# Patient Record
Sex: MALE | Race: WHITE | NOT HISPANIC OR LATINO | Employment: FULL TIME | RURAL
[De-identification: names, ages, dates, MRNs, and addresses within clinical notes are randomized per-mention and may not be internally consistent; named-entity substitution may affect disease eponyms.]

---

## 2021-05-10 VITALS — SYSTOLIC BLOOD PRESSURE: 164 MMHG | DIASTOLIC BLOOD PRESSURE: 98 MMHG | HEART RATE: 72 BPM | TEMPERATURE: 98 F

## 2021-05-10 RX ORDER — NEBIVOLOL 5 MG/1
5 TABLET ORAL DAILY
COMMUNITY
End: 2022-04-07 | Stop reason: DRUGHIGH

## 2022-04-07 ENCOUNTER — OFFICE VISIT (OUTPATIENT)
Dept: FAMILY MEDICINE | Facility: CLINIC | Age: 47
End: 2022-04-07
Payer: COMMERCIAL

## 2022-04-07 VITALS
HEART RATE: 67 BPM | HEIGHT: 69 IN | DIASTOLIC BLOOD PRESSURE: 87 MMHG | WEIGHT: 225 LBS | BODY MASS INDEX: 33.33 KG/M2 | SYSTOLIC BLOOD PRESSURE: 124 MMHG

## 2022-04-07 DIAGNOSIS — I10 PRIMARY HYPERTENSION: Primary | Chronic | ICD-10-CM

## 2022-04-07 DIAGNOSIS — E73.9 LACTOSE INTOLERANCE IN ADULT: ICD-10-CM

## 2022-04-07 PROCEDURE — 99203 PR OFFICE/OUTPT VISIT, NEW, LEVL III, 30-44 MIN: ICD-10-PCS | Mod: ,,, | Performed by: FAMILY MEDICINE

## 2022-04-07 PROCEDURE — 99203 OFFICE O/P NEW LOW 30 MIN: CPT | Mod: ,,, | Performed by: FAMILY MEDICINE

## 2022-04-07 RX ORDER — NEBIVOLOL 10 MG/1
10 TABLET ORAL DAILY
Qty: 90 TABLET | Refills: 2 | Status: SHIPPED | OUTPATIENT
Start: 2022-04-07 | End: 2022-05-06 | Stop reason: SDUPTHER

## 2022-04-07 RX ORDER — NEBIVOLOL 5 MG/1
5 TABLET ORAL DAILY
Qty: 30 TABLET | Refills: 2 | Status: CANCELLED | OUTPATIENT
Start: 2022-04-07

## 2022-04-07 NOTE — PROGRESS NOTES
Lorenzo Montes MD   [facility]  MEDICAL GROUP [address] [address]  [phone]      PATIENT NAME: Mp Cook  : 1975  DATE: 22  MRN: [id number]      Billing Provider: Lorenzo Montes MD  Level of Service:   Patient PCP Information       Provider PCP Type    Lorenzo Montes MD General            Reason for Visit / Chief Complaint: Follow-up (Medication refills )       Update PCP  Update Chief Complaint         History of Present Illness / Problem Focused Workflow     Mp Cook presents to the clinic with Follow-up (Medication refills )       47 yo WM treated for HTN.  Needs med refill.  Says that he monitors BP at home and is usually mildly elevated.    Has been having some lactose intolerance.  He denies any recent GI infection.      Review of Systems     Review of Systems   Constitutional: Negative for activity change and unexpected weight change.   HENT: Negative for hearing loss, rhinorrhea and trouble swallowing.    Eyes: Negative for discharge and visual disturbance.   Respiratory: Negative for chest tightness and wheezing.    Cardiovascular: Negative for chest pain and palpitations.   Gastrointestinal: Negative for blood in stool, constipation, diarrhea and vomiting.   Endocrine: Negative for polydipsia and polyuria.   Genitourinary: Negative for difficulty urinating, hematuria and urgency.   Musculoskeletal: Negative for arthralgias, joint swelling and neck pain.   Neurological: Negative for weakness and headaches.   Psychiatric/Behavioral: Negative for confusion and dysphoric mood.        Medical / Social / Family History   History reviewed. No pertinent past medical history.    History reviewed. No pertinent surgical history.    Social History    reports that he has never smoked. His smokeless tobacco use includes snuff. He reports current alcohol use of about 6.0 standard drinks of alcohol per week.    Social History     Tobacco Use    Smoking status: Never Smoker    Smokeless tobacco: Current User     Types: Snuff    Tobacco comment: Started Snuff in 2000.   Substance Use Topics    Alcohol use: Yes     Alcohol/week: 6.0 standard drinks     Types: 6 Cans of beer per week     Comment: drinks approximately a 6 pack every other weekend       Family History  Family History   Problem Relation Age of Onset    Hypertension Mother        Medications and Allergies     Medications  Outpatient Medications Marked as Taking for the 4/7/22 encounter (Office Visit) with Lorenzo Montes MD   Medication Sig Dispense Refill    [DISCONTINUED] nebivoloL (BYSTOLIC) 5 MG Tab Take 5 mg by mouth once daily.         Allergies  Review of patient's allergies indicates:   Allergen Reactions    Opioids - morphine analogues        Physical Examination     Vitals:    04/07/22 1439   BP: 124/87   Pulse: 67     Physical Exam  Vitals reviewed.   Constitutional:       Appearance: Normal appearance.   HENT:      Head: Normocephalic and atraumatic.   Eyes:      Extraocular Movements: Extraocular movements intact.      Conjunctiva/sclera: Conjunctivae normal.      Pupils: Pupils are equal, round, and reactive to light.   Cardiovascular:      Rate and Rhythm: Normal rate and regular rhythm.      Heart sounds: Normal heart sounds.   Pulmonary:      Effort: Pulmonary effort is normal.      Breath sounds: Normal breath sounds.   Musculoskeletal:         General: Normal range of motion.      Cervical back: Normal range of motion and neck supple.   Skin:     General: Skin is warm and dry.      Comments: Multiple UE tattoos   Neurological:      General: No focal deficit present.      Mental Status: He is alert and oriented to person, place, and time.   Psychiatric:         Mood and Affect: Mood normal.         Behavior: Behavior normal.          Assessment and Plan (including Health Maintenance)      Problem List  Smart Sets  Document Outside HM    :    Plan: will increase dose of bystolic to 10 mg.  He is asked to moniotr BP at home and maintain log.  Follow up in one month and bring BP log to visit.    Avoid dairy products x 1-2 weeks and see if GI symptoms resolve.  Gradually reintroduce daily to see if symptoms are present.  May try lactaid.          Health Maintenance Due   Topic Date Due    PROSTATE-SPECIFIC ANTIGEN  Never done    Hepatitis C Screening  Never done    Lipid Panel  Never done    HIV Screening  Never done    TETANUS VACCINE  Never done    Colorectal Cancer Screening  Never done    Influenza Vaccine (1) Never done    COVID-19 Vaccine (3 - Booster for Pfizer series) 04/19/2022       Problem List Items Addressed This Visit        Cardiac/Vascular    Primary hypertension - Primary (Chronic)    Relevant Medications    nebivoloL (BYSTOLIC) 10 MG Tab      Other Visit Diagnoses     Lactose intolerance in adult              The patient has no Health Maintenance topics of status Not Due    Future Appointments   Date Time Provider Department Center   5/6/2022  4:00 PM Lorenzo Montes MD [facility]            Signature:  MD BARBARA [facility]  MEDICAL GROUP [facility]  [address] [place] [address] [place] [address]  [phone]    Date of encounter: 4/7/22

## 2022-04-26 ENCOUNTER — TELEPHONE (OUTPATIENT)
Dept: FAMILY MEDICINE | Facility: CLINIC | Age: 47
End: 2022-04-26
Payer: COMMERCIAL

## 2022-05-06 ENCOUNTER — OFFICE VISIT (OUTPATIENT)
Dept: FAMILY MEDICINE | Facility: CLINIC | Age: 47
End: 2022-05-06
Payer: COMMERCIAL

## 2022-05-06 VITALS — HEART RATE: 63 BPM | DIASTOLIC BLOOD PRESSURE: 89 MMHG | SYSTOLIC BLOOD PRESSURE: 134 MMHG

## 2022-05-06 DIAGNOSIS — I10 PRIMARY HYPERTENSION: Primary | Chronic | ICD-10-CM

## 2022-05-06 DIAGNOSIS — M62.830 MUSCLE SPASM OF BACK: ICD-10-CM

## 2022-05-06 PROCEDURE — 99213 PR OFFICE/OUTPT VISIT, EST, LEVL III, 20-29 MIN: ICD-10-PCS | Mod: ,,, | Performed by: FAMILY MEDICINE

## 2022-05-06 PROCEDURE — 99213 OFFICE O/P EST LOW 20 MIN: CPT | Mod: ,,, | Performed by: FAMILY MEDICINE

## 2022-05-06 RX ORDER — TIZANIDINE 4 MG/1
4 TABLET ORAL EVERY 6 HOURS PRN
Qty: 20 TABLET | Refills: 0 | Status: SHIPPED | OUTPATIENT
Start: 2022-05-06 | End: 2022-05-17

## 2022-05-06 RX ORDER — NEBIVOLOL 10 MG/1
10 TABLET ORAL DAILY
Qty: 90 TABLET | Refills: 2 | Status: SHIPPED | OUTPATIENT
Start: 2022-05-06 | End: 2022-12-16 | Stop reason: SDUPTHER

## 2022-05-06 NOTE — PROGRESS NOTES
Lorenzo Montes MD   [facility]  MEDICAL GROUP [facility] - FAMILY [facility]  [address] [place] [address]  [phone]      PATIENT NAME: Mp Cook  : 1975  DATE: 22  MRN: [id number]      Billing Provider: Lorenzo Montes MD  Level of Service:   Patient PCP Information       Provider PCP Type    Lorenzo Montes MD General            Reason for Visit / Chief Complaint: Follow-up (1 Month Follow-up HTN. ) and Back Pain (Complains of mid lower back pain after lifting tailgate of trailer. Pain is positional. )       Update PCP  Update Chief Complaint         History of Present Illness / Problem Focused Workflow     Mp Cook presents to the clinic with Follow-up (1 Month Follow-up HTN. ) and Back Pain (Complains of mid lower back pain after lifting tailgate of trailer. Pain is positional. )       BP is doing well on increased dose of bystolic (10 mg daily- increased from 5 mg at last visit).    Back pain is mid-lower back.   Rates as 8/10.      Review of Systems     Review of Systems   Constitutional: Negative for chills, fatigue and fever.   HENT: Negative for sinus pressure/congestion, sore throat and trouble swallowing.    Eyes: Negative for pain, itching and visual disturbance.   Respiratory: Negative for cough, shortness of breath and wheezing.    Cardiovascular: Negative for chest pain, palpitations and leg swelling.   Gastrointestinal: Negative for abdominal pain, change in bowel habit, nausea, vomiting and change in bowel habit.   Musculoskeletal: Positive for back pain. Negative for arthralgias, joint swelling and myalgias.   Integumentary:  Negative for rash and mole/lesion.   Neurological: Negative for dizziness, syncope, weakness, light-headedness, numbness and headaches.   Psychiatric/Behavioral: Negative for confusion.        Medical / Social / Family History   History reviewed. No pertinent past medical history.    History reviewed. No  pertinent surgical history.    Social History    reports that he has never smoked. His smokeless tobacco use includes snuff. He reports current alcohol use of about 6.0 standard drinks of alcohol per week.   Social History     Tobacco Use    Smoking status: Never Smoker    Smokeless tobacco: Current User     Types: Snuff    Tobacco comment: Started Snuff in 2000.   Substance Use Topics    Alcohol use: Yes     Alcohol/week: 6.0 standard drinks     Types: 6 Cans of beer per week     Comment: drinks approximately a 6 pack every other weekend       Family History  Family History   Problem Relation Age of Onset    Hypertension Mother        Medications and Allergies     Medications  Outpatient Medications Marked as Taking for the 5/6/22 encounter (Office Visit) with Lorenzo Montes MD   Medication Sig Dispense Refill    [DISCONTINUED] nebivoloL (BYSTOLIC) 10 MG Tab Take 1 tablet (10 mg total) by mouth once daily. 90 tablet 2       Allergies  Review of patient's allergies indicates:   Allergen Reactions    Opioids - morphine analogues        Physical Examination     Vitals:    05/06/22 1608   BP: 134/89   Pulse: 63     Physical Exam     Assessment and Plan (including Health Maintenance)      Problem List  Smart Sets  Document Outside HM   :    Plan: continue bystolic 10 mg QD        Health Maintenance Due   Topic Date Due    PROSTATE-SPECIFIC ANTIGEN  Never done    Hepatitis C Screening  Never done    Lipid Panel  Never done    HIV Screening  Never done    TETANUS VACCINE  Never done    Colorectal Cancer Screening  Never done    COVID-19 Vaccine (3 - Booster for Pfizer series) 04/19/2022       Problem List Items Addressed This Visit        Cardiac/Vascular    Primary hypertension - Primary (Chronic)    Relevant Medications    nebivoloL (BYSTOLIC) 10 MG Tab      Other Visit Diagnoses     Muscle spasm of back        Relevant Medications    tiZANidine (ZANAFLEX) 4 MG tablet          Health Maintenance Topics  with due status: Not Due       Topic Last Completion Date    Influenza Vaccine Not Due       No future appointments.         Signature:  MD BARBARA Wilcox [facility]  MEDICAL GROUP [address] [address] [phone]    Date of encounter: 5/6/22

## 2022-05-17 RX ORDER — TIZANIDINE 4 MG/1
4 TABLET ORAL EVERY 6 HOURS PRN
Qty: 20 TABLET | Refills: 0
Start: 2022-05-17 | End: 2022-05-27

## 2022-08-30 ENCOUNTER — OFFICE VISIT (OUTPATIENT)
Dept: FAMILY MEDICINE | Facility: CLINIC | Age: 47
End: 2022-08-30
Payer: COMMERCIAL

## 2022-08-30 VITALS
OXYGEN SATURATION: 98 % | HEIGHT: 69 IN | SYSTOLIC BLOOD PRESSURE: 141 MMHG | HEART RATE: 71 BPM | BODY MASS INDEX: 33.33 KG/M2 | DIASTOLIC BLOOD PRESSURE: 96 MMHG | WEIGHT: 225 LBS
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
## 2022-08-30 DIAGNOSIS — M79.674 TOE PAIN, RIGHT: Primary | ICD-10-CM

## 2022-08-30 PROCEDURE — 99212 OFFICE O/P EST SF 10 MIN: CPT | Mod: ,,, | Performed by: NURSE PRACTITIONER

## 2022-08-30 PROCEDURE — 99212 PR OFFICE/OUTPT VISIT, EST, LEVL II, 10-19 MIN: ICD-10-PCS | Mod: ,,, | Performed by: NURSE PRACTITIONER

## 2022-08-30 NOTE — LETTER
August 30, 2022      [facility]  [address] [place] [address]  Phone: [phone]  Fax: [phone]       Patient: Mp Cook   YOB: 1975  Date of Visit: 08/30/2022    To Whom It May Concern:    YECENIA Cook  was at [facility] on 08/30/2022. The patient may return to work/school on 08/30/2022.  If you have any questions or concerns, or if I can be of further assistance, please do not hesitate to contact me.    Sincerely,    BECKA AburtoP

## 2022-08-30 NOTE — PROGRESS NOTES
Clinic note     Patient name: Mp Cook is a 46 y.o. male   Chief compliant   Chief Complaint   Patient presents with    Toe Injury     [place]y [place].        Subjective     History of present illness   In clinic for evaluation of right 5th toe pain since Sunday when he slipped while walking on rocks. States his foot went down between two large rocks. He denies any other injuries   His employer wanted him to have it evaluated before he reporting back to work today   He reports pain to right 5th toe with weight bearing    PCP: Dr Montes         Social History     Tobacco Use    Smoking status: Never    Smokeless tobacco: Current     Types: Snuff    Tobacco comments:     Started Snuff in 2000.   Substance Use Topics    Alcohol use: Yes     Alcohol/week: 6.0 standard drinks     Types: 6 Cans of beer per week     Comment: drinks approximately a 6 pack every other weekend    Drug use: Never       Review of patient's allergies indicates:   Allergen Reactions    Opioids - morphine analogues        Past Medical History:   Diagnosis Date    Hypertension        Past Surgical History:   Procedure Laterality Date    APPENDECTOMY      TONSILLECTOMY          Family History   Problem Relation Age of Onset    Hypertension Mother          Current Outpatient Medications:     nebivoloL (BYSTOLIC) 10 MG Tab, Take 1 tablet (10 mg total) by mouth once daily., Disp: 90 tablet, Rfl: 2    Review of Systems   Constitutional:  Negative for chills and fever.   Respiratory:  Negative for cough and shortness of breath.    Cardiovascular:  Negative for chest pain, palpitations and leg swelling.   Gastrointestinal:  Negative for abdominal pain.   Musculoskeletal:  Positive for arthralgias. Negative for gait problem and myalgias.        Right 5th toe pain   Integumentary:  Negative for wound.   Neurological:  Negative for dizziness and headaches.     Objective     BP (!) 141/96 (BP Location: Left arm, Patient  "Position: Sitting, BP Method: Large (Automatic))   Pulse 71   Ht 5' 9" (1.753 m)   Wt 102.1 kg (225 lb)   SpO2 98%   BMI 33.23 kg/m²     Physical Exam   Constitutional: He is oriented to person, place, and time. No distress.   HENT:   Head: Atraumatic.   Cardiovascular: Normal rate and regular rhythm. Pulmonary:      Effort: Pulmonary effort is normal. No respiratory distress.      Breath sounds: Normal breath sounds. No wheezing, rhonchi or rales.     Musculoskeletal:      Cervical back: Neck supple.        Feet:    Neurological: He is alert and oriented to person, place, and time. Gait abnormal.   Skin: Skin is warm and dry.   Psychiatric: His behavior is normal. Mood normal.   EXAMINATION:  XR TOE 2 OR MORE VIEWS RIGHT     CLINICAL HISTORY:  Unspecified fall, initial encounter     COMPARISON:  None available     FINDINGS:  No evidence of fracture seen.  The alignment of the joints appears normal.  No degenerative change is present.  No soft tissue abnormality is seen.     Impression:     No evidence of abnormality demonstrated        Electronically signed by: Sonu Esparza  Date:                                            08/30/2022  Time:                                           11:23           Exam Ended: 08/30/22 11:12           No results found for: WBC, HGB, HCT, MCV, PLT    CMP  No results found for: NA, K, CL, CO2, GLU, BUN, CREATININE, CALCIUM, PROT, ALBUMIN, BILITOT, ALKPHOS, AST, ALT, ANIONGAP, ESTGFRAFRICA, EGFRNONAA  No results found for: TSH  No results found for: CHOL  No results found for: HDL  No results found for: LDLCALC  No results found for: TRIG  No results found for: CHOLHDL  No results found for: LABA1C, HGBA1C      Assessment and Plan   Toe pain, right  -     X-Ray Toe 2 or More Views Right; Future; Expected date: 08/30/2022    Fall, initial encounter  -     X-Ray Toe 2 or More Views Right; Future; Expected date: 08/30/2022          Patient Instructions  Patient Instructions   Wear " good supportive shoe  Follow up in clinic if symptoms persist     Xray results reviewed, instructed if pain continues rtc in 5-7 days for repeat xray

## 2022-12-16 ENCOUNTER — OFFICE VISIT (OUTPATIENT)
Dept: FAMILY MEDICINE | Facility: CLINIC | Age: 47
End: 2022-12-16
Payer: COMMERCIAL

## 2022-12-16 VITALS
DIASTOLIC BLOOD PRESSURE: 87 MMHG | WEIGHT: 225 LBS | BODY MASS INDEX: 33.33 KG/M2 | HEART RATE: 67 BPM | HEIGHT: 69 IN | SYSTOLIC BLOOD PRESSURE: 137 MMHG

## 2022-12-16 DIAGNOSIS — I10 PRIMARY HYPERTENSION: Primary | Chronic | ICD-10-CM

## 2022-12-16 DIAGNOSIS — R05.8 PRODUCTIVE COUGH: ICD-10-CM

## 2022-12-16 DIAGNOSIS — J06.9 UPPER RESPIRATORY TRACT INFECTION, UNSPECIFIED TYPE: ICD-10-CM

## 2022-12-16 DIAGNOSIS — R06.2 WHEEZING: ICD-10-CM

## 2022-12-16 PROCEDURE — 99213 PR OFFICE/OUTPT VISIT, EST, LEVL III, 20-29 MIN: ICD-10-PCS | Mod: ,,, | Performed by: FAMILY MEDICINE

## 2022-12-16 PROCEDURE — 99213 OFFICE O/P EST LOW 20 MIN: CPT | Mod: ,,, | Performed by: FAMILY MEDICINE

## 2022-12-16 RX ORDER — NEBIVOLOL 10 MG/1
10 TABLET ORAL DAILY
Qty: 90 TABLET | Refills: 2 | Status: SHIPPED | OUTPATIENT
Start: 2022-12-16 | End: 2023-08-01 | Stop reason: SDUPTHER

## 2022-12-16 RX ORDER — AZITHROMYCIN 250 MG/1
TABLET, FILM COATED ORAL
Qty: 6 TABLET | Refills: 0 | Status: SHIPPED | OUTPATIENT
Start: 2022-12-16 | End: 2022-12-21

## 2022-12-16 RX ORDER — ALBUTEROL SULFATE 90 UG/1
2 AEROSOL, METERED RESPIRATORY (INHALATION) EVERY 6 HOURS PRN
Qty: 18 G | Refills: 0 | Status: SHIPPED | OUTPATIENT
Start: 2022-12-16 | End: 2023-03-09 | Stop reason: SDUPTHER

## 2022-12-16 RX ORDER — DEXAMETHASONE 0.75 MG/1
TABLET ORAL
Qty: 21 TABLET | Refills: 0 | Status: SHIPPED | OUTPATIENT
Start: 2022-12-16 | End: 2023-02-14 | Stop reason: ALTCHOICE

## 2022-12-16 RX ORDER — NEBIVOLOL 10 MG/1
10 TABLET ORAL DAILY
Qty: 90 TABLET | Refills: 2 | Status: SHIPPED | OUTPATIENT
Start: 2022-12-16 | End: 2022-12-16

## 2022-12-19 NOTE — PROGRESS NOTES
Lorenzo Montes MD   [email or web address]  MEDICAL GROUP [facility] FAMILY [address] [address]  [phone]      PATIENT NAME: Mp Cook  : 1975  DATE: 22  MRN: [id number]      Billing Provider: Loernzo Montes MD  Level of Service:   Patient PCP Information       Provider PCP Type    Lorenzo Montes MD General            Reason for Visit / Chief Complaint: Shortness of Breath (Pt c/o SOB a productive cough with clear- yellowish phlegm x 2 weeks no fever)       Update PCP  Update Chief Complaint         History of Present Illness / Problem Focused Workflow     Mp Cook presents to the clinic with Shortness of Breath (Pt c/o SOB a productive cough with clear- yellowish phlegm x 2 weeks no fever)       48 yo WM with HTN.  Needs BP med refilled.  He is also c/o productive cough, SOB and wheezing x > 2 weeks.    Shortness of Breath  Associated symptoms include wheezing. Pertinent negatives include no abdominal pain, chest pain, fever, headaches, leg swelling, rash, sore throat or vomiting.     Review of Systems     Review of Systems   Constitutional:  Negative for chills, fatigue and fever.   HENT:  Negative for sinus pressure/congestion, sore throat and trouble swallowing.    Respiratory:  Positive for cough, shortness of breath and wheezing.    Cardiovascular:  Negative for chest pain, palpitations and leg swelling.   Gastrointestinal:  Negative for abdominal pain, change in bowel habit, nausea, vomiting and change in bowel habit.   Integumentary:  Negative for rash.   Neurological:  Negative for dizziness, syncope, weakness, light-headedness, numbness and headaches.   Psychiatric/Behavioral:  Negative for confusion.       Medical / Social / Family History     Past Medical History:   Diagnosis Date    Hypertension        Past Surgical History:   Procedure Laterality Date    APPENDECTOMY      TONSILLECTOMY         Social History    reports  that he has never smoked. His smokeless tobacco use includes snuff. He reports current alcohol use of about 6.0 standard drinks per week. He reports that he does not use drugs.   Social History     Tobacco Use    Smoking status: Never    Smokeless tobacco: Current     Types: Snuff    Tobacco comments:     Started Snuff in 2000.   Substance Use Topics    Alcohol use: Yes     Alcohol/week: 6.0 standard drinks     Types: 6 Cans of beer per week     Comment: drinks approximately a 6 pack every other weekend    Drug use: Never       Family History  Family History   Problem Relation Age of Onset    Hypertension Mother        Medications and Allergies     Medications  No outpatient medications have been marked as taking for the 12/16/22 encounter (Office Visit) with Loernzo Montes MD.       Allergies  Review of patient's allergies indicates:   Allergen Reactions    Opioids - morphine analogues        Physical Examination     Vitals:    12/16/22 1106   BP: 137/87   Pulse: 67     Physical Exam  Vitals reviewed.   Constitutional:       Appearance: Normal appearance.   HENT:      Head: Normocephalic and atraumatic.   Eyes:      Extraocular Movements: Extraocular movements intact.      Conjunctiva/sclera: Conjunctivae normal.      Pupils: Pupils are equal, round, and reactive to light.   Cardiovascular:      Rate and Rhythm: Normal rate and regular rhythm.      Heart sounds: Normal heart sounds.   Pulmonary:      Effort: Pulmonary effort is normal.      Breath sounds: Normal breath sounds. No wheezing.   Musculoskeletal:         General: Normal range of motion.      Cervical back: Normal range of motion and neck supple.   Skin:     General: Skin is warm and dry.   Neurological:      General: No focal deficit present.      Mental Status: He is alert and oriented to person, place, and time.   Psychiatric:         Mood and Affect: Mood normal.         Behavior: Behavior normal.        Assessment and Plan (including Health  Maintenance)      Problem List  Smart Sets  Document Outside HM   :    Plan:         Health Maintenance Due   Topic Date Due    PROSTATE-SPECIFIC ANTIGEN  Never done    Hepatitis C Screening  Never done    Lipid Panel  Never done    HIV Screening  Never done    TETANUS VACCINE  Never done    Colorectal Cancer Screening  Never done    COVID-19 Vaccine (3 - Booster for Pfizer series) 01/14/2022    Influenza Vaccine (1) Never done       Problem List Items Addressed This Visit          Cardiac/Vascular    Primary hypertension - Primary (Chronic)    Relevant Medications    nebivoloL (BYSTOLIC) 10 MG Tab     Other Visit Diagnoses       Productive cough        Relevant Medications    dexAMETHasone (DECADRON) 0.75 MG Tab    Upper respiratory tract infection, unspecified type        Relevant Medications    dexAMETHasone (DECADRON) 0.75 MG Tab    azithromycin (Z-DIVYA) 250 MG tablet    albuterol (VENTOLIN HFA) 90 mcg/actuation inhaler    Wheezing        Relevant Medications    dexAMETHasone (DECADRON) 0.75 MG Tab    albuterol (VENTOLIN HFA) 90 mcg/actuation inhaler            The patient has no Health Maintenance topics of status Not Due    No future appointments.         Signature:  MD BARBARA Wilcox [facility]  MEDICAL GROUP [facility] FAMILY MEDICINE  [address] [place] [address]  [phone]    Date of encounter: 12/16/22

## 2023-02-14 ENCOUNTER — OFFICE VISIT (OUTPATIENT)
Dept: FAMILY MEDICINE | Facility: CLINIC | Age: 48
End: 2023-02-14
Payer: COMMERCIAL

## 2023-02-14 VITALS
WEIGHT: 225 LBS | HEIGHT: 69 IN | BODY MASS INDEX: 33.33 KG/M2 | HEART RATE: 84 BPM | DIASTOLIC BLOOD PRESSURE: 84 MMHG | SYSTOLIC BLOOD PRESSURE: 135 MMHG

## 2023-02-14 DIAGNOSIS — R05.1 ACUTE COUGH: ICD-10-CM

## 2023-02-14 DIAGNOSIS — J06.9 UPPER RESPIRATORY TRACT INFECTION, UNSPECIFIED TYPE: Primary | ICD-10-CM

## 2023-02-14 PROCEDURE — 99213 PR OFFICE/OUTPT VISIT, EST, LEVL III, 20-29 MIN: ICD-10-PCS | Mod: ,,, | Performed by: FAMILY MEDICINE

## 2023-02-14 PROCEDURE — 99213 OFFICE O/P EST LOW 20 MIN: CPT | Mod: ,,, | Performed by: FAMILY MEDICINE

## 2023-02-14 RX ORDER — CODEINE PHOSPHATE AND GUAIFENESIN 10; 100 MG/5ML; MG/5ML
5 SOLUTION ORAL EVERY 6 HOURS PRN
Qty: 118 ML | Refills: 0 | Status: SHIPPED | OUTPATIENT
Start: 2023-02-14 | End: 2023-02-24

## 2023-02-14 RX ORDER — DEXAMETHASONE 0.75 MG/1
TABLET ORAL
Qty: 15 TABLET | Refills: 0 | Status: SHIPPED | OUTPATIENT
Start: 2023-02-14 | End: 2023-03-09 | Stop reason: SDUPTHER

## 2023-02-14 RX ORDER — DOXYCYCLINE 100 MG/1
100 CAPSULE ORAL 2 TIMES DAILY
Qty: 14 CAPSULE | Refills: 0 | Status: SHIPPED | OUTPATIENT
Start: 2023-02-14 | End: 2023-06-09 | Stop reason: SDUPTHER

## 2023-02-16 NOTE — PROGRESS NOTES
Lorenzo Montes MD   [facility]  [facility] [address] [place] [address]  [phone]      PATIENT NAME: Mp Cook  : 1975  DATE: 23  MRN: [id number]      Billing Provider: Lorenzo Montes MD  Level of Service:   Patient PCP Information       Provider PCP Type    Lorenzo Montes MD General            Reason for Visit / Chief Complaint: Cough (Complains of cough that has been present since . Denies any other symptoms. Declined COVID/Flu screen. Patient partially vaccinated for COVID. )       Update PCP  Update Chief Complaint         History of Present Illness / Problem Focused Workflow     Mp Cook presents to the clinic with Cough (Complains of cough that has been present since . Denies any other symptoms. Declined COVID/Flu screen. Patient partially vaccinated for COVID. )       46 yo WM with complaint of 2 week h/o cough productive of very thick sputum.  Has been using mucinex but is not helping much.  Says that he is drinking plenty of water while taking.  Per pt, he has history of childhood asthma but had not had problems for years until he got a COVID-19 infection.  He now has increasing FAN.  He wants to be referred to a pulmonologist for evaluation/PFTs.        Cough  Associated symptoms include shortness of breath. Pertinent negatives include no chest pain, chills, fever, headaches, rash, sore throat or wheezing.     Review of Systems     Review of Systems   Constitutional:  Negative for chills, fatigue and fever.   HENT:  Negative for sinus pressure/congestion, sore throat and trouble swallowing.    Respiratory:  Positive for cough and shortness of breath. Negative for wheezing.    Cardiovascular:  Negative for chest pain, palpitations and leg swelling.   Gastrointestinal:  Negative for abdominal pain, change in bowel habit, nausea, vomiting and change in bowel habit.   Integumentary:   Negative for rash.   Neurological:  Negative for dizziness, syncope, weakness, light-headedness, numbness and headaches.   Psychiatric/Behavioral:  Negative for confusion.       Medical / Social / Family History     Past Medical History:   Diagnosis Date    Hypertension        Past Surgical History:   Procedure Laterality Date    APPENDECTOMY      TONSILLECTOMY         Social History    reports that he has never smoked. He has never been exposed to tobacco smoke. His smokeless tobacco use includes snuff. He reports current alcohol use of about 6.0 standard drinks per week. He reports that he does not use drugs.   Social History     Tobacco Use    Smoking status: Never     Passive exposure: Never    Smokeless tobacco: Current     Types: Snuff    Tobacco comments:     Started Snuff in 2000.   Substance Use Topics    Alcohol use: Yes     Alcohol/week: 6.0 standard drinks     Types: 6 Cans of beer per week     Comment: drinks approximately a 6 pack every other weekend    Drug use: Never       Family History  Family History   Problem Relation Age of Onset    Hypertension Mother        Medications and Allergies     Medications  Outpatient Medications Marked as Taking for the 2/14/23 encounter (Office Visit) with Lorenzo Montes MD   Medication Sig Dispense Refill    albuterol (VENTOLIN HFA) 90 mcg/actuation inhaler Inhale 2 puffs into the lungs every 6 (six) hours as needed for Wheezing. Rescue 18 g 0    nebivoloL (BYSTOLIC) 10 MG Tab Take 1 tablet (10 mg total) by mouth once daily. 90 tablet 2       Allergies  Review of patient's allergies indicates:   Allergen Reactions    Opioids - morphine analogues        Physical Examination     Vitals:    02/14/23 1617   BP: 135/84   Pulse: 84     Physical Exam  Vitals reviewed.   Constitutional:       Appearance: Normal appearance.   HENT:      Head: Normocephalic and atraumatic.   Eyes:      Extraocular Movements: Extraocular movements intact.      Conjunctiva/sclera:  Conjunctivae normal.      Pupils: Pupils are equal, round, and reactive to light.   Cardiovascular:      Rate and Rhythm: Normal rate and regular rhythm.      Heart sounds: Normal heart sounds.   Pulmonary:      Effort: Pulmonary effort is normal. No respiratory distress.      Breath sounds: No stridor. Wheezing present. No rhonchi or rales.   Musculoskeletal:         General: Normal range of motion.      Cervical back: Normal range of motion and neck supple.   Skin:     General: Skin is warm and dry.   Neurological:      General: No focal deficit present.      Mental Status: He is alert and oriented to person, place, and time.   Psychiatric:         Mood and Affect: Mood normal.         Behavior: Behavior normal.        Assessment and Plan (including Health Maintenance)      Problem List  Smart Sets  Document Outside HM   :    Plan: will refer to pulmonologist as requested.  We do not do PFTs here in clinic.          Health Maintenance Due   Topic Date Due    PROSTATE-SPECIFIC ANTIGEN  Never done    Hepatitis C Screening  Never done    Lipid Panel  Never done    HIV Screening  Never done    TETANUS VACCINE  Never done    Hemoglobin A1c (Diabetic Prevention Screening)  Never done    Colorectal Cancer Screening  Never done    COVID-19 Vaccine (3 - Booster for Pfizer series) 01/14/2022    Influenza Vaccine (1) Never done       Problem List Items Addressed This Visit    None  Visit Diagnoses       Upper respiratory tract infection, unspecified type    -  Primary    Relevant Medications    doxycycline (MONODOX) 100 MG capsule    dexAMETHasone (DECADRON) 0.75 MG Tab    Acute cough        Relevant Medications    guaiFENesin-codeine 100-10 mg/5 ml (TUSSI-ORGANIDIN NR)  mg/5 mL syrup            The patient has no Health Maintenance topics of status Not Due    No future appointments.         Signature:  Lorenzo Montes MD  [facility]  [facility] [address]  [facility] [address]  [phone]    Date of encounter: 2/14/23

## 2023-03-07 ENCOUNTER — OFFICE VISIT (OUTPATIENT)
Dept: FAMILY MEDICINE | Facility: CLINIC | Age: 48
End: 2023-03-07
Payer: COMMERCIAL

## 2023-03-07 DIAGNOSIS — R06.2 WHEEZING: ICD-10-CM

## 2023-03-07 DIAGNOSIS — R06.02 SOB (SHORTNESS OF BREATH): Primary | ICD-10-CM

## 2023-03-07 DIAGNOSIS — R05.8 PRODUCTIVE COUGH: ICD-10-CM

## 2023-03-07 PROCEDURE — 99213 PR OFFICE/OUTPT VISIT, EST, LEVL III, 20-29 MIN: ICD-10-PCS | Mod: 95,,, | Performed by: FAMILY MEDICINE

## 2023-03-07 PROCEDURE — 99213 OFFICE O/P EST LOW 20 MIN: CPT | Mod: 95,,, | Performed by: FAMILY MEDICINE

## 2023-03-07 NOTE — PROGRESS NOTES
Established Patient - Audio Only Telehealth Visit     The patient location is: home  The chief complaint leading to consultation is: cough, wheezing and SOB  Visit type: Virtual visit with audio only (telephone)  Total time spent with patient: 8 minutes       The reason for the audio only service rather than synchronous audio and video virtual visit was related to technical difficulties or patient preference/necessity.     Each patient to whom I provide medical services by telemedicine is:  (1) informed of the relationship between the physician and patient and the respective role of any other health care provider with respect to management of the patient; and (2) notified that they may decline to receive medical services by telemedicine and may withdraw from such care at any time. Patient verbally consented to receive this service via voice-only telephone call.       HPI: 48 yo WM who has been having ongoing cough, wheezing and shortness of breath that started in December after having COVID.  He has been using an albuterol inhaler and taking mucinex.  Symptoms due vary in intensity but never fully resolve.  He often has clear sputum but has also complained of thick, string-like phlegm.  He is wanting a referral to see a pulmonologist  and wants to have PFTs done.  SOB has interfered with work at times.       Assessment and plan:  refer to pulm as patient requests.  We do not due pulmonary function testing in clinic.                          This service was not originating from a related E/M service provided within the previous 7 days nor will  to an E/M service or procedure within the next 24 hours or my soonest available appointment.  Prevailing standard of care was able to be met in this audio-only visit.

## 2023-03-09 DIAGNOSIS — J06.9 UPPER RESPIRATORY TRACT INFECTION, UNSPECIFIED TYPE: ICD-10-CM

## 2023-03-09 DIAGNOSIS — R06.2 WHEEZING: ICD-10-CM

## 2023-03-09 RX ORDER — ALBUTEROL SULFATE 90 UG/1
2 AEROSOL, METERED RESPIRATORY (INHALATION) EVERY 6 HOURS PRN
Qty: 18 G | Refills: 0 | Status: SHIPPED | OUTPATIENT
Start: 2023-03-09 | End: 2023-08-22 | Stop reason: SDUPTHER

## 2023-03-09 RX ORDER — DEXAMETHASONE 0.75 MG/1
TABLET ORAL
Qty: 15 TABLET | Refills: 0 | Status: SHIPPED | OUTPATIENT
Start: 2023-03-09 | End: 2023-06-09 | Stop reason: SDUPTHER

## 2023-03-09 RX ORDER — AZITHROMYCIN 250 MG/1
TABLET, FILM COATED ORAL
Qty: 6 TABLET | Refills: 0 | Status: SHIPPED | OUTPATIENT
Start: 2023-03-09 | End: 2023-03-14

## 2023-04-05 ENCOUNTER — OFFICE VISIT (OUTPATIENT)
Dept: PULMONOLOGY | Facility: CLINIC | Age: 48
End: 2023-04-05
Payer: COMMERCIAL

## 2023-04-05 VITALS
BODY MASS INDEX: 33.33 KG/M2 | DIASTOLIC BLOOD PRESSURE: 78 MMHG | OXYGEN SATURATION: 98 % | WEIGHT: 225 LBS | HEIGHT: 69 IN | SYSTOLIC BLOOD PRESSURE: 153 MMHG | RESPIRATION RATE: 20 BRPM | HEART RATE: 59 BPM

## 2023-04-05 DIAGNOSIS — R06.02 SOB (SHORTNESS OF BREATH): ICD-10-CM

## 2023-04-05 PROCEDURE — 99215 OFFICE O/P EST HI 40 MIN: CPT | Mod: PBBFAC | Performed by: INTERNAL MEDICINE

## 2023-04-05 PROCEDURE — 99203 PR OFFICE/OUTPT VISIT, NEW, LEVL III, 30-44 MIN: ICD-10-PCS | Mod: S$PBB,,, | Performed by: INTERNAL MEDICINE

## 2023-04-05 PROCEDURE — 99203 OFFICE O/P NEW LOW 30 MIN: CPT | Mod: S$PBB,,, | Performed by: INTERNAL MEDICINE

## 2023-04-05 RX ORDER — BUDESONIDE AND FORMOTEROL FUMARATE DIHYDRATE 160; 4.5 UG/1; UG/1
2 AEROSOL RESPIRATORY (INHALATION) EVERY 12 HOURS
Qty: 10.2 G | Refills: 5 | Status: SHIPPED | OUTPATIENT
Start: 2023-04-05 | End: 2023-06-09

## 2023-04-05 NOTE — ASSESSMENT & PLAN NOTE
Patient has been short of breath since he had COVID he has some complaints of reflux works as an  my plan is this is probably either COVID bronchospasm related interstitial disease will get PFTs and a chest chest x-ray and start him onsymbicort

## 2023-04-05 NOTE — PROGRESS NOTES
Subjective:       Patient ID: Mp Cook is a 47 y.o. male.    Chief Complaint: Shortness of Breath    Shortness of Breath  This is a chronic problem. The current episode started more than 1 month ago. The problem has been unchanged. Pertinent negatives include no abdominal pain, chest pain, ear pain, headaches or rash.   Past Medical History:   Diagnosis Date    Hypertension      Past Surgical History:   Procedure Laterality Date    APPENDECTOMY      TONSILLECTOMY       Family History   Problem Relation Age of Onset    Hypertension Mother      Review of patient's allergies indicates:   Allergen Reactions    Opioids - morphine analogues       Social History     Tobacco Use    Smoking status: Never     Passive exposure: Never    Smokeless tobacco: Current     Types: Snuff    Tobacco comments:     Started Snuff in 2000.   Substance Use Topics    Alcohol use: Yes     Alcohol/week: 6.0 standard drinks     Types: 6 Cans of beer per week     Comment: drinks approximately a 6 pack every other weekend    Drug use: Never      Review of Systems   Constitutional:  Negative for chills, activity change and night sweats.   HENT:  Negative for congestion and ear pain.    Eyes:  Negative for redness and itching.   Respiratory:  Positive for shortness of breath.    Cardiovascular:  Negative for chest pain and palpitations.   Musculoskeletal:  Negative for arthralgias and back pain.   Skin:  Negative for rash.   Gastrointestinal:  Negative for abdominal pain and abdominal distention.   Neurological:  Negative for dizziness and headaches.   Hematological:  Negative for adenopathy. Does not bruise/bleed easily.   Psychiatric/Behavioral:  Negative for confusion. The patient is not nervous/anxious.      Objective:      Physical Exam   Constitutional: He is oriented to person, place, and time. He appears well-developed and well-nourished.   HENT:   Head: Normocephalic.   Nose: Nose normal.   Mouth/Throat: Oropharynx is clear and  moist.   Neck: No JVD present. No thyromegaly present.   Cardiovascular: Normal rate, regular rhythm, normal heart sounds and intact distal pulses.   Pulmonary/Chest: Normal expansion, hyperinflation, symmetric chest wall expansion, effort normal and breath sounds normal.   Abdominal: Soft. Bowel sounds are normal.   Musculoskeletal:         General: Normal range of motion.      Cervical back: Normal range of motion and neck supple.   Lymphadenopathy: No supraclavicular adenopathy is present.     He has no cervical adenopathy.   Neurological: He is alert and oriented to person, place, and time. He has normal reflexes.   Skin: Skin is warm and dry.   Psychiatric: He has a normal mood and affect. His behavior is normal.   Personal Diagnostic Review  none pertinent    No flowsheet data found.      Assessment:       1. SOB (shortness of breath)        Outpatient Encounter Medications as of 2023   Medication Sig Dispense Refill    albuterol (VENTOLIN HFA) 90 mcg/actuation inhaler Inhale 2 puffs into the lungs every 6 (six) hours as needed for Wheezing. Rescue 18 g 0    nebivoloL (BYSTOLIC) 10 MG Tab Take 1 tablet (10 mg total) by mouth once daily. 90 tablet 2    [] azithromycin (Z-DIVYA) 250 MG tablet Take 2 tablets by mouth on day 1; Take 1 tablet by mouth on days 2-5 6 tablet 0    budesonide-formoterol 160-4.5 mcg (SYMBICORT) 160-4.5 mcg/actuation HFAA Inhale 2 puffs into the lungs every 12 (twelve) hours. Controller 10.2 g 5    dexAMETHasone (DECADRON) 0.75 MG Tab 2 tablets PO in AM and 1 tablet PO in PM (Patient not taking: Reported on 2023) 15 tablet 0    doxycycline (MONODOX) 100 MG capsule Take 1 capsule (100 mg total) by mouth 2 (two) times daily. (Patient not taking: Reported on 2023) 14 capsule 0     No facility-administered encounter medications on file as of 2023.     Orders Placed This Encounter   Procedures    X-Ray Chest PA And Lateral     Standing Status:   Future     Standing  Expiration Date:   4/5/2024     Order Specific Question:   May the Radiologist modify the order per protocol to meet the clinical needs of the patient?     Answer:   Yes    Complete PFT with bronchodilator     Standing Status:   Future     Standing Expiration Date:   4/5/2024     Order Specific Question:   Release to patient     Answer:   Immediate       Plan:       Problem List Items Addressed This Visit          Pulmonary    SOB (shortness of breath)     Patient has been short of breath since he had COVID he has some complaints of reflux works as an  my plan is this is probably either COVID bronchospasm related interstitial disease will get PFTs and a chest chest x-ray and start him onsymbicort           Relevant Medications    budesonide-formoterol 160-4.5 mcg (SYMBICORT) 160-4.5 mcg/actuation HFAA    Other Relevant Orders    X-Ray Chest PA And Lateral    Complete PFT with bronchodilator

## 2023-05-25 ENCOUNTER — HOSPITAL ENCOUNTER (OUTPATIENT)
Dept: RADIOLOGY | Facility: HOSPITAL | Age: 48
Discharge: HOME | End: 2023-05-25
Attending: INTERNAL MEDICINE
Payer: COMMERCIAL

## 2023-05-25 ENCOUNTER — OFFICE VISIT (OUTPATIENT)
Dept: PULMONOLOGY | Facility: CLINIC | Age: 48
End: 2023-05-25
Payer: COMMERCIAL

## 2023-05-25 ENCOUNTER — CLINICAL SUPPORT (OUTPATIENT)
Dept: PULMONOLOGY | Facility: HOSPITAL | Age: 48
End: 2023-05-25
Attending: INTERNAL MEDICINE
Payer: COMMERCIAL

## 2023-05-25 VITALS
OXYGEN SATURATION: 97 % | HEIGHT: 69 IN | RESPIRATION RATE: 16 BRPM | DIASTOLIC BLOOD PRESSURE: 74 MMHG | BODY MASS INDEX: 33.33 KG/M2 | SYSTOLIC BLOOD PRESSURE: 116 MMHG | WEIGHT: 225 LBS | HEART RATE: 76 BPM

## 2023-05-25 DIAGNOSIS — R06.02 SOB (SHORTNESS OF BREATH): ICD-10-CM

## 2023-05-25 PROCEDURE — 94729 DIFFUSING CAPACITY: CPT | Mod: 26,,, | Performed by: INTERNAL MEDICINE

## 2023-05-25 PROCEDURE — 3078F DIAST BP <80 MM HG: CPT | Mod: ,,, | Performed by: INTERNAL MEDICINE

## 2023-05-25 PROCEDURE — 94729 PR C02/MEMBANE DIFFUSE CAPACITY: ICD-10-PCS | Mod: 26,,, | Performed by: INTERNAL MEDICINE

## 2023-05-25 PROCEDURE — 3074F PR MOST RECENT SYSTOLIC BLOOD PRESSURE < 130 MM HG: ICD-10-PCS | Mod: ,,, | Performed by: INTERNAL MEDICINE

## 2023-05-25 PROCEDURE — 3008F BODY MASS INDEX DOCD: CPT | Mod: ,,, | Performed by: INTERNAL MEDICINE

## 2023-05-25 PROCEDURE — 1159F PR MEDICATION LIST DOCUMENTED IN MEDICAL RECORD: ICD-10-PCS | Mod: ,,, | Performed by: INTERNAL MEDICINE

## 2023-05-25 PROCEDURE — 71046 XR CHEST PA AND LATERAL: ICD-10-PCS | Mod: 26,,, | Performed by: RADIOLOGY

## 2023-05-25 PROCEDURE — 1159F MED LIST DOCD IN RCRD: CPT | Mod: ,,, | Performed by: INTERNAL MEDICINE

## 2023-05-25 PROCEDURE — 94726 PLETHYSMOGRAPHY LUNG VOLUMES: CPT

## 2023-05-25 PROCEDURE — 94060 EVALUATION OF WHEEZING: CPT | Mod: 26,,, | Performed by: INTERNAL MEDICINE

## 2023-05-25 PROCEDURE — 99213 OFFICE O/P EST LOW 20 MIN: CPT | Mod: S$PBB,25,, | Performed by: INTERNAL MEDICINE

## 2023-05-25 PROCEDURE — 71046 X-RAY EXAM CHEST 2 VIEWS: CPT | Mod: TC

## 2023-05-25 PROCEDURE — 3078F PR MOST RECENT DIASTOLIC BLOOD PRESSURE < 80 MM HG: ICD-10-PCS | Mod: ,,, | Performed by: INTERNAL MEDICINE

## 2023-05-25 PROCEDURE — 94060 PR EVAL OF BRONCHOSPASM: ICD-10-PCS | Mod: 26,,, | Performed by: INTERNAL MEDICINE

## 2023-05-25 PROCEDURE — 94729 DIFFUSING CAPACITY: CPT

## 2023-05-25 PROCEDURE — 94060 EVALUATION OF WHEEZING: CPT

## 2023-05-25 PROCEDURE — 94726 PLETHYSMOGRAPHY LUNG VOLUMES: CPT | Mod: 26,,, | Performed by: INTERNAL MEDICINE

## 2023-05-25 PROCEDURE — 99213 PR OFFICE/OUTPT VISIT, EST, LEVL III, 20-29 MIN: ICD-10-PCS | Mod: S$PBB,25,, | Performed by: INTERNAL MEDICINE

## 2023-05-25 PROCEDURE — 71046 X-RAY EXAM CHEST 2 VIEWS: CPT | Mod: 26,,, | Performed by: RADIOLOGY

## 2023-05-25 PROCEDURE — 94726 PULM FUNCT TST PLETHYSMOGRAP: ICD-10-PCS | Mod: 26,,, | Performed by: INTERNAL MEDICINE

## 2023-05-25 PROCEDURE — 3008F PR BODY MASS INDEX (BMI) DOCUMENTED: ICD-10-PCS | Mod: ,,, | Performed by: INTERNAL MEDICINE

## 2023-05-25 PROCEDURE — 99214 OFFICE O/P EST MOD 30 MIN: CPT | Mod: PBBFAC,25 | Performed by: INTERNAL MEDICINE

## 2023-05-25 PROCEDURE — 3074F SYST BP LT 130 MM HG: CPT | Mod: ,,, | Performed by: INTERNAL MEDICINE

## 2023-05-25 NOTE — ASSESSMENT & PLAN NOTE
Patient has been short of breath since he had COVID he had pulmonary function test done today that were normal maybe some bronchospastic response but otherwise no issues x-ray was unremarkable this is probably inflammatory bronchospasm related to COVID add Symbicort but he feels like his symptoms are better and then do Ventolin will reassess in 6 months

## 2023-05-25 NOTE — PROCEDURES
Pulmonary function test  Forced vital capacity 4.56 L 94% predicted  FEV1 4 L or 103% predicted  FEV1 ratio 80%   Normal small airways mild broncho reactivity  Normal lung volumes  Normal DLCO  Normal pulmonary function test

## 2023-05-25 NOTE — PROGRESS NOTES
Subjective:       Patient ID: Mp Cook III is a 47 y.o. male.    Chief Complaint: Shortness of Breath and Follow-up    Shortness of Breath  This is a chronic problem. The current episode started more than 1 month ago. The problem has been unchanged. Pertinent negatives include no abdominal pain, chest pain, ear pain, headaches or rash.   Follow-up  Pertinent negatives include no abdominal pain, arthralgias, chest pain, chills, congestion, headaches or rash.   Past Medical History:   Diagnosis Date    Hypertension      Past Surgical History:   Procedure Laterality Date    APPENDECTOMY      TONSILLECTOMY       Family History   Problem Relation Age of Onset    Hypertension Mother      Review of patient's allergies indicates:   Allergen Reactions    Opioids - morphine analogues       Social History     Tobacco Use    Smoking status: Never     Passive exposure: Never    Smokeless tobacco: Current     Types: Snuff    Tobacco comments:     Started Snuff in 2000.   Substance Use Topics    Alcohol use: Yes     Alcohol/week: 6.0 standard drinks     Types: 6 Cans of beer per week     Comment: drinks approximately a 6 pack every other weekend    Drug use: Never      Review of Systems   Constitutional:  Negative for chills, activity change and night sweats.   HENT:  Negative for congestion and ear pain.    Eyes:  Negative for redness and itching.   Respiratory:  Positive for shortness of breath.    Cardiovascular:  Negative for chest pain and palpitations.   Musculoskeletal:  Negative for arthralgias and back pain.   Skin:  Negative for rash.   Gastrointestinal:  Negative for abdominal pain and abdominal distention.   Neurological:  Negative for dizziness and headaches.   Hematological:  Negative for adenopathy. Does not bruise/bleed easily.   Psychiatric/Behavioral:  Negative for confusion. The patient is not nervous/anxious.      Objective:      Physical Exam   Constitutional: He is oriented to person, place, and time.  He appears well-developed and well-nourished.   HENT:   Head: Normocephalic.   Nose: Nose normal.   Mouth/Throat: Oropharynx is clear and moist.   Neck: No JVD present. No thyromegaly present.   Cardiovascular: Normal rate, regular rhythm, normal heart sounds and intact distal pulses.   Pulmonary/Chest: Normal expansion, hyperinflation, symmetric chest wall expansion, effort normal and breath sounds normal.   Abdominal: Soft. Bowel sounds are normal.   Musculoskeletal:         General: Normal range of motion.      Cervical back: Normal range of motion and neck supple.   Lymphadenopathy: No supraclavicular adenopathy is present.     He has no cervical adenopathy.   Neurological: He is alert and oriented to person, place, and time. He has normal reflexes.   Skin: Skin is warm and dry.   Psychiatric: He has a normal mood and affect. His behavior is normal.   Personal Diagnostic Review  none pertinent    No flowsheet data found.      Assessment:       1. SOB (shortness of breath)        Outpatient Encounter Medications as of 5/25/2023   Medication Sig Dispense Refill    nebivoloL (BYSTOLIC) 10 MG Tab Take 1 tablet (10 mg total) by mouth once daily. 90 tablet 2    albuterol (VENTOLIN HFA) 90 mcg/actuation inhaler Inhale 2 puffs into the lungs every 6 (six) hours as needed for Wheezing. Rescue (Patient not taking: Reported on 5/25/2023) 18 g 0    budesonide-formoterol 160-4.5 mcg (SYMBICORT) 160-4.5 mcg/actuation HFAA Inhale 2 puffs into the lungs every 12 (twelve) hours. Controller (Patient not taking: Reported on 5/25/2023) 10.2 g 5    dexAMETHasone (DECADRON) 0.75 MG Tab 2 tablets PO in AM and 1 tablet PO in PM (Patient not taking: Reported on 4/5/2023) 15 tablet 0    doxycycline (MONODOX) 100 MG capsule Take 1 capsule (100 mg total) by mouth 2 (two) times daily. (Patient not taking: Reported on 4/5/2023) 14 capsule 0     No facility-administered encounter medications on file as of 5/25/2023.     No orders of the  defined types were placed in this encounter.      Plan:       Problem List Items Addressed This Visit          Pulmonary    SOB (shortness of breath)     Patient has been short of breath since he had COVID he had pulmonary function test done today that were normal maybe some bronchospastic response but otherwise no issues x-ray was unremarkable this is probably inflammatory bronchospasm related to COVID add Symbicort but he feels like his symptoms are better and then do Ventolin will reassess in 6 months

## 2023-06-09 ENCOUNTER — OFFICE VISIT (OUTPATIENT)
Dept: FAMILY MEDICINE | Facility: CLINIC | Age: 48
End: 2023-06-09
Payer: COMMERCIAL

## 2023-06-09 VITALS
DIASTOLIC BLOOD PRESSURE: 75 MMHG | SYSTOLIC BLOOD PRESSURE: 116 MMHG | HEIGHT: 69 IN | HEART RATE: 64 BPM | BODY MASS INDEX: 33.33 KG/M2 | WEIGHT: 225 LBS

## 2023-06-09 DIAGNOSIS — R06.02 SOB (SHORTNESS OF BREATH): Primary | Chronic | ICD-10-CM

## 2023-06-09 DIAGNOSIS — J06.9 UPPER RESPIRATORY TRACT INFECTION, UNSPECIFIED TYPE: ICD-10-CM

## 2023-06-09 DIAGNOSIS — R05.3 CHRONIC COUGH: Chronic | ICD-10-CM

## 2023-06-09 DIAGNOSIS — R06.81 WITNESSED APNEIC SPELLS: ICD-10-CM

## 2023-06-09 PROCEDURE — 99214 PR OFFICE/OUTPT VISIT, EST, LEVL IV, 30-39 MIN: ICD-10-PCS | Mod: ,,, | Performed by: FAMILY MEDICINE

## 2023-06-09 PROCEDURE — 99214 OFFICE O/P EST MOD 30 MIN: CPT | Mod: ,,, | Performed by: FAMILY MEDICINE

## 2023-06-09 PROCEDURE — 1159F MED LIST DOCD IN RCRD: CPT | Mod: ,,, | Performed by: FAMILY MEDICINE

## 2023-06-09 PROCEDURE — 3074F SYST BP LT 130 MM HG: CPT | Mod: ,,, | Performed by: FAMILY MEDICINE

## 2023-06-09 PROCEDURE — 3074F PR MOST RECENT SYSTOLIC BLOOD PRESSURE < 130 MM HG: ICD-10-PCS | Mod: ,,, | Performed by: FAMILY MEDICINE

## 2023-06-09 PROCEDURE — 3008F BODY MASS INDEX DOCD: CPT | Mod: ,,, | Performed by: FAMILY MEDICINE

## 2023-06-09 PROCEDURE — 3078F PR MOST RECENT DIASTOLIC BLOOD PRESSURE < 80 MM HG: ICD-10-PCS | Mod: ,,, | Performed by: FAMILY MEDICINE

## 2023-06-09 PROCEDURE — 3008F PR BODY MASS INDEX (BMI) DOCUMENTED: ICD-10-PCS | Mod: ,,, | Performed by: FAMILY MEDICINE

## 2023-06-09 PROCEDURE — 1159F PR MEDICATION LIST DOCUMENTED IN MEDICAL RECORD: ICD-10-PCS | Mod: ,,, | Performed by: FAMILY MEDICINE

## 2023-06-09 PROCEDURE — 3078F DIAST BP <80 MM HG: CPT | Mod: ,,, | Performed by: FAMILY MEDICINE

## 2023-06-09 RX ORDER — FLUTICASONE FUROATE AND VILANTEROL 100; 25 UG/1; UG/1
1 POWDER RESPIRATORY (INHALATION) DAILY
Qty: 28 EACH | Refills: 0
Start: 2023-06-09 | End: 2023-08-22 | Stop reason: SDUPTHER

## 2023-06-09 RX ORDER — DOXYCYCLINE 100 MG/1
100 CAPSULE ORAL 2 TIMES DAILY
Qty: 14 CAPSULE | Refills: 0 | Status: SHIPPED | OUTPATIENT
Start: 2023-06-09 | End: 2023-08-23

## 2023-06-09 RX ORDER — DEXAMETHASONE 0.75 MG/1
TABLET ORAL
Qty: 15 TABLET | Refills: 0 | Status: SHIPPED | OUTPATIENT
Start: 2023-06-09 | End: 2023-08-23

## 2023-06-09 NOTE — PROGRESS NOTES
Lorenzo Montes MD   [facility]  [facility]ICAL [facility] [address] [place] [address]  [phone]      PATIENT NAME: Mp Cook III  : 1975  DATE: 23  MRN: [id number]      Billing Provider: Lorenzo Montes MD  Level of Service:   Patient PCP Information       Provider PCP Type    Lorenzo Montes MD General            Reason for Visit / Chief Complaint: Shortness of Breath, Cough, and passed out (Follow up  from ER- [facility])       Update PCP  Update Chief Complaint         History of Present Illness / Problem Focused Workflow     Mp Cook III presents to the clinic with Shortness of Breath, Cough, and passed out (Follow up  from ER- [facility])       48 yo WM with chronic cough and SOB that started after a COVID-19 infection.  He has been seen by pulmonology and has had CXR and PFTs done, which were normal.  It was thought that he was having inflammatory bronchospasm related to COVID.  He reportedly had an intense coughing spell last night and passed out, hitting his head in the process.  He went to the ED at [facility] in [place] and had work up there that was negative.  We have requested those records but have not received them as of yet.  He says that he had a CXR and CT head with no abnormal findings.  He denies any CP or palpitations.  Cough is reportedly productive and has been taking mucinex.  Wife is also worried because he has apneic spells when he is sleeping.  He reportedly does snore also.      Shortness of Breath  Pertinent negatives include no abdominal pain, chest pain or leg swelling.   Cough  Associated symptoms include shortness of breath. Pertinent negatives include no chest pain.     Review of Systems     Review of Systems   Constitutional:  Positive for fatigue.   Respiratory:  Positive for cough and shortness of breath.    Cardiovascular:  Negative for chest pain, palpitations and leg swelling.   Gastrointestinal:   Negative for abdominal pain and nausea.   Psychiatric/Behavioral:  Positive for decreased concentration and sleep disturbance.       Medical / Social / Family History     Past Medical History:   Diagnosis Date    Hypertension        Past Surgical History:   Procedure Laterality Date    APPENDECTOMY      TONSILLECTOMY         Social History    reports that he has never smoked. He has never been exposed to tobacco smoke. His smokeless tobacco use includes snuff. He reports current alcohol use of about 6.0 standard drinks per week. He reports that he does not use drugs.   Social History     Tobacco Use    Smoking status: Never     Passive exposure: Never    Smokeless tobacco: Current     Types: Snuff    Tobacco comments:     Started Snuff in 2000.   Substance Use Topics    Alcohol use: Yes     Alcohol/week: 6.0 standard drinks     Types: 6 Cans of beer per week     Comment: drinks approximately a 6 pack every other weekend    Drug use: Never       Family History  Family History   Problem Relation Age of Onset    Hypertension Mother        Medications and Allergies     Medications  Outpatient Medications Marked as Taking for the 6/9/23 encounter (Office Visit) with Lorenzo Montes MD   Medication Sig Dispense Refill    albuterol (VENTOLIN HFA) 90 mcg/actuation inhaler Inhale 2 puffs into the lungs every 6 (six) hours as needed for Wheezing. Rescue 18 g 0    nebivoloL (BYSTOLIC) 10 MG Tab Take 1 tablet (10 mg total) by mouth once daily. 90 tablet 2    [DISCONTINUED] dexAMETHasone (DECADRON) 0.75 MG Tab 2 tablets PO in AM and 1 tablet PO in PM 15 tablet 0       Allergies  Review of patient's allergies indicates:   Allergen Reactions    Opioids - morphine analogues        Physical Examination     Vitals:    06/09/23 1532   BP: 116/75   Pulse: 64     Physical Exam  Vitals reviewed.   Constitutional:       Appearance: Normal appearance.   HENT:      Head: Normocephalic and atraumatic.   Eyes:      Extraocular Movements:  Extraocular movements intact.      Conjunctiva/sclera: Conjunctivae normal.      Pupils: Pupils are equal, round, and reactive to light.   Cardiovascular:      Rate and Rhythm: Normal rate and regular rhythm.      Heart sounds: Normal heart sounds.   Pulmonary:      Effort: Pulmonary effort is normal.      Breath sounds: Normal breath sounds. No wheezing, rhonchi or rales.      Comments: Coarse upper airway noise  Musculoskeletal:         General: Normal range of motion.      Cervical back: Normal range of motion and neck supple.   Skin:     General: Skin is warm and dry.      Findings: Bruising (right forehead from fall last night) present.   Neurological:      General: No focal deficit present.      Mental Status: He is alert and oriented to person, place, and time.   Psychiatric:         Mood and Affect: Mood normal.         Behavior: Behavior normal.      X-Ray Chest PA And Lateral  Narrative: EXAMINATION:  XR CHEST PA AND LATERAL    CLINICAL HISTORY:  Shortness of breath    TECHNIQUE:  PA and lateral views of the chest were performed.    COMPARISON:  None    FINDINGS:  Heart size normal. Lungs clear. No pneumothorax or pleural effusion.  Degenerative changes seen of the spine.  Impression: No acute findings.    Electronically signed by: Gary Morfin  Date:    05/25/2023  Time:    15:18    Procedure Note    Pulmonary function test  Forced vital capacity 4.56 L 94% predicted  FEV1 4 L or 103% predicted  FEV1 ratio 80%   Normal small airways mild broncho reactivity  Normal lung volumes  Normal DLCO  Normal pulmonary function test       Last Resulted: 05/25/23 13:30           Assessment and Plan (including Health Maintenance)      Problem List  Smart Sets  Document Outside HM   :    Plan: he was prescribed symbicort by pulmonologist but never got filled due to cost.  Will provide him with samples of Breo Ellipta (one puff daily).  Continue to use albuterol as a rescue inhaler.  Will refer to sleep clinic to be  evaluated for sleep apnea.          Health Maintenance Due   Topic Date Due    PROSTATE-SPECIFIC ANTIGEN  Never done    Hepatitis C Screening  Never done    Lipid Panel  Never done    HIV Screening  Never done    TETANUS VACCINE  Never done    Hemoglobin A1c (Diabetic Prevention Screening)  Never done    Colorectal Cancer Screening  Never done    COVID-19 Vaccine (3 - Pfizer series) 01/14/2022       Problem List Items Addressed This Visit          Pulmonary    SOB (shortness of breath) - Primary    Relevant Medications    fluticasone furoate-vilanteroL (BREO ELLIPTA) 100-25 mcg/dose diskus inhaler     Other Visit Diagnoses       Upper respiratory tract infection, unspecified type        Relevant Medications    dexAMETHasone (DECADRON) 0.75 MG Tab    doxycycline (MONODOX) 100 MG capsule    Witnessed apneic spells        Relevant Orders    Ambulatory referral/consult to Sleep Disorders    Chronic cough  (Chronic)               Health Maintenance Topics with due status: Not Due       Topic Last Completion Date    Influenza Vaccine Not Due       No future appointments.         Signature:  MD BARBARA Wilcox [facility]  MEDICAL GROUP [facility]  [address] [place] [id number]  [phone]    Date of encounter: 6/9/23

## 2023-08-01 DIAGNOSIS — I10 PRIMARY HYPERTENSION: Chronic | ICD-10-CM

## 2023-08-01 RX ORDER — NEBIVOLOL 10 MG/1
10 TABLET ORAL DAILY
Qty: 90 TABLET | Refills: 2 | Status: SHIPPED | OUTPATIENT
Start: 2023-08-01 | End: 2024-07-31

## 2023-08-01 NOTE — TELEPHONE ENCOUNTER
----- Message from Debora Johnson sent at 8/1/2023  1:38 PM CDT -----  Contact: [phone]  PT NEEDS REFILL ON BP MEDS CALLED TO [facility] ON [address]    TOOK LAST 1 THIS MORNING

## 2023-08-22 ENCOUNTER — OFFICE VISIT (OUTPATIENT)
Dept: FAMILY MEDICINE | Facility: CLINIC | Age: 48
End: 2023-08-22
Payer: COMMERCIAL

## 2023-08-22 VITALS
TEMPERATURE: 98 F | OXYGEN SATURATION: 96 % | HEIGHT: 69 IN | WEIGHT: 226.25 LBS | SYSTOLIC BLOOD PRESSURE: 142 MMHG | HEART RATE: 69 BPM | BODY MASS INDEX: 33.51 KG/M2 | DIASTOLIC BLOOD PRESSURE: 98 MMHG

## 2023-08-22 DIAGNOSIS — R06.02 SOB (SHORTNESS OF BREATH): Chronic | ICD-10-CM

## 2023-08-22 DIAGNOSIS — I10 PRIMARY HYPERTENSION: Chronic | ICD-10-CM

## 2023-08-22 DIAGNOSIS — R06.2 WHEEZING: ICD-10-CM

## 2023-08-22 DIAGNOSIS — J06.9 UPPER RESPIRATORY TRACT INFECTION, UNSPECIFIED TYPE: ICD-10-CM

## 2023-08-22 DIAGNOSIS — R05.9 COUGH, UNSPECIFIED TYPE: Primary | ICD-10-CM

## 2023-08-22 PROCEDURE — 99214 OFFICE O/P EST MOD 30 MIN: CPT | Mod: ,,,

## 2023-08-22 PROCEDURE — 3077F SYST BP >= 140 MM HG: CPT | Mod: ,,,

## 2023-08-22 PROCEDURE — 1160F RVW MEDS BY RX/DR IN RCRD: CPT | Mod: ,,,

## 2023-08-22 PROCEDURE — 3008F BODY MASS INDEX DOCD: CPT | Mod: ,,,

## 2023-08-22 PROCEDURE — 3080F DIAST BP >= 90 MM HG: CPT | Mod: ,,,

## 2023-08-22 PROCEDURE — 1159F MED LIST DOCD IN RCRD: CPT | Mod: ,,,

## 2023-08-22 PROCEDURE — 99214 PR OFFICE/OUTPT VISIT, EST, LEVL IV, 30-39 MIN: ICD-10-PCS | Mod: ,,,

## 2023-08-22 PROCEDURE — 1159F PR MEDICATION LIST DOCUMENTED IN MEDICAL RECORD: ICD-10-PCS | Mod: ,,,

## 2023-08-22 PROCEDURE — 3008F PR BODY MASS INDEX (BMI) DOCUMENTED: ICD-10-PCS | Mod: ,,,

## 2023-08-22 PROCEDURE — 1160F PR REVIEW ALL MEDS BY PRESCRIBER/CLIN PHARMACIST DOCUMENTED: ICD-10-PCS | Mod: ,,,

## 2023-08-22 PROCEDURE — 3080F PR MOST RECENT DIASTOLIC BLOOD PRESSURE >= 90 MM HG: ICD-10-PCS | Mod: ,,,

## 2023-08-22 PROCEDURE — 3077F PR MOST RECENT SYSTOLIC BLOOD PRESSURE >= 140 MM HG: ICD-10-PCS | Mod: ,,,

## 2023-08-22 RX ORDER — BENZONATATE 100 MG/1
100 CAPSULE ORAL 3 TIMES DAILY PRN
Qty: 20 CAPSULE | Refills: 0 | Status: SHIPPED | OUTPATIENT
Start: 2023-08-22 | End: 2023-09-01

## 2023-08-22 RX ORDER — METHYLPREDNISOLONE 4 MG/1
TABLET ORAL
Qty: 21 EACH | Refills: 0 | Status: SHIPPED | OUTPATIENT
Start: 2023-08-22 | End: 2023-09-12

## 2023-08-22 RX ORDER — FLUTICASONE FUROATE AND VILANTEROL 100; 25 UG/1; UG/1
1 POWDER RESPIRATORY (INHALATION) DAILY
Qty: 28 EACH | Refills: 0
Start: 2023-08-22 | End: 2023-08-23 | Stop reason: SDUPTHER

## 2023-08-22 RX ORDER — ALBUTEROL SULFATE 90 UG/1
2 AEROSOL, METERED RESPIRATORY (INHALATION) EVERY 6 HOURS PRN
Qty: 18 G | Refills: 0 | Status: SHIPPED | OUTPATIENT
Start: 2023-08-22 | End: 2024-03-15

## 2023-08-22 NOTE — PROGRESS NOTES
PATIENT NAME: Mp Cook III  : 1975  DATE: 23  MRN: [id number]      Reason for Visit / Chief Complaint: Respiratory Distress (Room 7// Patient has difficulty breathing at times he states he has had covid, walking pneumonia and bronchitis. )       Update PCP  Update Chief Complaint         History of Present Illness / Problem Focused Workflow     Mp Cook III presents to the clinic with Respiratory Distress (Room 7// Patient has difficulty breathing at times he states he has had covid, walking pneumonia and bronchitis. )     Presents to clinic today for increased sob starting over the weekend. He reports cleaning a cabin with a lot of dust and believes this flared his symptoms up. Followed by pulmonology and had normal pulm func tests in may. Due to see dr. Caldwell again in November. Reports he was taking breo and this was helping symptoms but he ran out of the sample.         Review of Systems     @Review of Systems   Constitutional:  Negative for chills, fatigue and fever.   HENT:  Negative for ear discharge, ear pain, sinus pressure/congestion, sneezing and sore throat.    Respiratory:  Positive for cough, shortness of breath and wheezing.    Cardiovascular:  Negative for chest pain, palpitations and leg swelling.   Gastrointestinal:  Negative for abdominal pain, change in bowel habit, constipation, diarrhea, nausea, vomiting and change in bowel habit.   Genitourinary:  Negative for difficulty urinating.       Medical / Social / Family History     Past Medical History:   Diagnosis Date    Hypertension        Past Surgical History:   Procedure Laterality Date    APPENDECTOMY      TONSILLECTOMY         Social History    reports that he has never smoked. He has never been exposed to tobacco smoke. His smokeless tobacco use includes snuff. He reports current alcohol use of about 6.0 standard drinks of alcohol per week. He reports that he does not use drugs.    Family History  's family  history includes Hypertension in his mother.    Medications and Allergies     Medications  Outpatient Medications Marked as Taking for the 8/22/23 encounter (Office Visit) with Wen Aldana FNP-C   Medication Sig Dispense Refill    nebivoloL (BYSTOLIC) 10 MG Tab Take 1 tablet (10 mg total) by mouth once daily. 90 tablet 2    [DISCONTINUED] albuterol (VENTOLIN HFA) 90 mcg/actuation inhaler Inhale 2 puffs into the lungs every 6 (six) hours as needed for Wheezing. Rescue 18 g 0       Allergies  Review of patient's allergies indicates:   Allergen Reactions    Opioids - morphine analogues        Physical Examination     Vitals:    08/22/23 1534   BP: (!) 142/98   Pulse:    Temp:      Physical Exam  Vitals and nursing note reviewed.   Constitutional:       Appearance: Normal appearance. He is normal weight.   HENT:      Head: Normocephalic.      Right Ear: Tympanic membrane, ear canal and external ear normal.      Left Ear: Tympanic membrane, ear canal and external ear normal.      Nose: Nose normal.      Mouth/Throat:      Mouth: Mucous membranes are moist.      Pharynx: Oropharynx is clear.   Eyes:      Extraocular Movements: Extraocular movements intact.      Conjunctiva/sclera: Conjunctivae normal.      Pupils: Pupils are equal, round, and reactive to light.   Cardiovascular:      Rate and Rhythm: Normal rate and regular rhythm.      Pulses: Normal pulses.      Heart sounds: Normal heart sounds.   Pulmonary:      Effort: Pulmonary effort is normal. No respiratory distress.      Breath sounds: Normal air entry. Examination of the left-middle field reveals wheezing. Examination of the left-lower field reveals wheezing. Wheezing present. No decreased breath sounds.   Abdominal:      General: Abdomen is flat. Bowel sounds are normal.      Palpations: Abdomen is soft.   Musculoskeletal:         General: Normal range of motion.      Cervical back: Normal range of motion and neck supple.   Skin:     General: Skin is  warm and dry.      Capillary Refill: Capillary refill takes less than 2 seconds.   Neurological:      General: No focal deficit present.      Mental Status: He is alert and oriented to person, place, and time.   Psychiatric:         Mood and Affect: Mood normal.         Behavior: Behavior normal.         Thought Content: Thought content normal.         Judgment: Judgment normal.                    Procedures   Assessment and Plan (including Health Maintenance)      Problem List  Smart Sets  Document Outside HM   :    Plan:   Problem List Items Addressed This Visit          Pulmonary    SOB (shortness of breath)    Current Assessment & Plan     Send refill of breo, ventolin hfa, medrol dose pack, and tessalon  Instructed pt to take mucinex otc and increase water intake.   Go to ER for increased difficulty breathing or chest pain.   Instructed patient to use ventolin inhaler when he got home.            Relevant Medications    fluticasone furoate-vilanteroL (BREO ELLIPTA) 100-25 mcg/dose diskus inhaler       Cardiac/Vascular    Primary hypertension (Chronic)    Current Assessment & Plan     BP elevated in clinic today. Reports drinking energy drink prior to arrival.           Other Visit Diagnoses       Cough, unspecified type    -  Primary    Relevant Medications    benzonatate (TESSALON) 100 MG capsule    Upper respiratory tract infection, unspecified type        Relevant Medications    methylPREDNISolone (MEDROL DOSEPACK) 4 mg tablet    albuterol (VENTOLIN HFA) 90 mcg/actuation inhaler    Wheezing        Relevant Medications    albuterol (VENTOLIN HFA) 90 mcg/actuation inhaler            No future appointments.          Follow up if symptoms worsen or fail to improve.     Signature:  MADELEINE MANZO        Date of encounter: 8/22/23

## 2023-08-22 NOTE — ASSESSMENT & PLAN NOTE
Send refill of breo, ventolin hfa, medrol dose pack, and tessalon  Instructed pt to take mucinex otc and increase water intake.   Go to ER for increased difficulty breathing or chest pain.   Instructed patient to use ventolin inhaler when he got home.

## 2023-08-22 NOTE — PATIENT INSTRUCTIONS
Take mucinex over the counter. Drink extra water.  prescriptions and take as directed. Do 2 puffs of albuterol inhaler when you get to it and then as needed for wheezing/shortness of breath. Take zyrtec OTC.

## 2023-08-23 RX ORDER — FLUTICASONE FUROATE AND VILANTEROL 100; 25 UG/1; UG/1
1 POWDER RESPIRATORY (INHALATION) DAILY
Qty: 28 EACH | Refills: 2 | Status: SHIPPED | OUTPATIENT
Start: 2023-08-23 | End: 2023-12-06

## 2023-12-06 DIAGNOSIS — R06.02 SOB (SHORTNESS OF BREATH): Chronic | ICD-10-CM

## 2023-12-06 RX ORDER — FLUTICASONE FUROATE AND VILANTEROL TRIFENATATE 100; 25 UG/1; UG/1
1 POWDER RESPIRATORY (INHALATION)
Qty: 60 EACH | Refills: 0 | Status: SHIPPED | OUTPATIENT
Start: 2023-12-06 | End: 2024-01-16

## 2023-12-06 NOTE — TELEPHONE ENCOUNTER
Attempted to contact patient using both numbers on file with no answer. Patient needs to schedule visit with pulmonologist as he was supposed to follow up with Dr. Caldwell last month for a 6 month f/u. Will send in refill of medication and attempt to contact the patient again regarding f/u

## 2024-01-14 DIAGNOSIS — R06.02 SOB (SHORTNESS OF BREATH): Chronic | ICD-10-CM

## 2024-01-16 DIAGNOSIS — R06.02 SOB (SHORTNESS OF BREATH): Chronic | ICD-10-CM

## 2024-01-16 RX ORDER — FLUTICASONE FUROATE AND VILANTEROL TRIFENATATE 100; 25 UG/1; UG/1
1 POWDER RESPIRATORY (INHALATION)
Qty: 60 EACH | Refills: 0 | Status: SHIPPED | OUTPATIENT
Start: 2024-01-16 | End: 2024-01-16

## 2024-01-16 RX ORDER — FLUTICASONE FUROATE AND VILANTEROL 100; 25 UG/1; UG/1
1 POWDER RESPIRATORY (INHALATION)
Qty: 180 EACH | Refills: 0 | Status: SHIPPED | OUTPATIENT
Start: 2024-01-16 | End: 2024-04-29

## 2024-03-15 ENCOUNTER — OFFICE VISIT (OUTPATIENT)
Dept: FAMILY MEDICINE | Facility: CLINIC | Age: 49
End: 2024-03-15
Payer: COMMERCIAL

## 2024-03-15 VITALS
HEIGHT: 69 IN | RESPIRATION RATE: 18 BRPM | OXYGEN SATURATION: 99 % | DIASTOLIC BLOOD PRESSURE: 90 MMHG | TEMPERATURE: 99 F | HEART RATE: 77 BPM | WEIGHT: 230 LBS | BODY MASS INDEX: 34.07 KG/M2 | SYSTOLIC BLOOD PRESSURE: 154 MMHG

## 2024-03-15 DIAGNOSIS — R05.9 COUGH, UNSPECIFIED TYPE: ICD-10-CM

## 2024-03-15 DIAGNOSIS — R52 BODY ACHES: ICD-10-CM

## 2024-03-15 DIAGNOSIS — J34.9 SINUS PROBLEM: ICD-10-CM

## 2024-03-15 DIAGNOSIS — R68.83 CHILLS: ICD-10-CM

## 2024-03-15 DIAGNOSIS — J02.9 ACUTE PHARYNGITIS, UNSPECIFIED ETIOLOGY: ICD-10-CM

## 2024-03-15 DIAGNOSIS — U07.1 COVID-19: Primary | ICD-10-CM

## 2024-03-15 LAB
CTP QC/QA: YES
FLUAV AG NPH QL: NEGATIVE
FLUBV AG NPH QL: NEGATIVE
MOLECULAR STREP A: NEGATIVE
POC MOLECULAR INFLUENZA A AGN: NEGATIVE
POC MOLECULAR INFLUENZA B AGN: NEGATIVE
S PYO RRNA THROAT QL PROBE: NEGATIVE
SARS-COV-2 AG RESP QL IA.RAPID: POSITIVE
SARS-COV-2 RDRP RESP QL NAA+PROBE: NEGATIVE

## 2024-03-15 PROCEDURE — 87651 STREP A DNA AMP PROBE: CPT | Mod: QW,,, | Performed by: NURSE PRACTITIONER

## 2024-03-15 PROCEDURE — 99214 OFFICE O/P EST MOD 30 MIN: CPT | Mod: ,,, | Performed by: NURSE PRACTITIONER

## 2024-03-15 PROCEDURE — 3077F SYST BP >= 140 MM HG: CPT | Mod: ,,, | Performed by: NURSE PRACTITIONER

## 2024-03-15 PROCEDURE — 87635 SARS-COV-2 COVID-19 AMP PRB: CPT | Mod: QW,,, | Performed by: NURSE PRACTITIONER

## 2024-03-15 PROCEDURE — 87502 INFLUENZA DNA AMP PROBE: CPT | Mod: QW,,, | Performed by: NURSE PRACTITIONER

## 2024-03-15 PROCEDURE — 3080F DIAST BP >= 90 MM HG: CPT | Mod: ,,, | Performed by: NURSE PRACTITIONER

## 2024-03-15 PROCEDURE — 3008F BODY MASS INDEX DOCD: CPT | Mod: ,,, | Performed by: NURSE PRACTITIONER

## 2024-03-15 RX ORDER — AZITHROMYCIN 250 MG/1
250 TABLET, FILM COATED ORAL DAILY
Qty: 6 TABLET | Refills: 0 | Status: SHIPPED | OUTPATIENT
Start: 2024-03-15 | End: 2024-03-20

## 2024-03-15 RX ORDER — DEXAMETHASONE 6 MG/1
TABLET ORAL
COMMUNITY
Start: 2023-06-09

## 2024-03-15 RX ORDER — MELOXICAM 15 MG/1
15 TABLET ORAL DAILY PRN
COMMUNITY
Start: 2023-12-18

## 2024-03-15 RX ORDER — AZITHROMYCIN 250 MG/1
250 TABLET, FILM COATED ORAL
COMMUNITY
Start: 2024-02-15 | End: 2024-03-15

## 2024-03-15 RX ORDER — GUAIFENESIN 100 MG/5ML
200 SOLUTION ORAL 3 TIMES DAILY PRN
Qty: 473 ML | Refills: 0 | Status: SHIPPED | OUTPATIENT
Start: 2024-03-15 | End: 2024-03-25

## 2024-03-15 RX ORDER — ALBUTEROL SULFATE 90 UG/1
2 AEROSOL, METERED RESPIRATORY (INHALATION) EVERY 6 HOURS PRN
Qty: 8.5 G | Refills: 0 | Status: SHIPPED | OUTPATIENT
Start: 2024-03-15 | End: 2025-03-15

## 2024-03-15 NOTE — PROGRESS NOTES
Subjective:       Patient ID: Mp Cook III is a 48 y.o. male.    Chief Complaint: Generalized Body Aches, Fatigue, Cough, and Sinus Problem    Generalized Body Aches, Fatigue, Cough, and Sinus Problem      Fatigue  Associated symptoms include congestion, coughing and fatigue. Pertinent negatives include no abdominal pain, change in bowel habit, chest pain, fever, headaches, nausea, neck pain, rash, sore throat, vomiting or weakness.   Cough  Pertinent negatives include no chest pain, ear pain, fever, headaches, rash, sore throat or shortness of breath.   Sinus Problem  Associated symptoms include congestion, coughing and sinus pressure. Pertinent negatives include no ear pain, headaches, neck pain, shortness of breath or sore throat.     Review of Systems   Constitutional:  Positive for fatigue. Negative for appetite change and fever.   HENT:  Positive for nasal congestion and sinus pressure/congestion. Negative for ear pain and sore throat.    Eyes:  Negative for pain, discharge and itching.   Respiratory:  Positive for cough. Negative for shortness of breath.    Cardiovascular:  Negative for chest pain and leg swelling.   Gastrointestinal:  Negative for abdominal pain, change in bowel habit, nausea and vomiting.   Musculoskeletal:  Negative for back pain, gait problem and neck pain.   Integumentary:  Negative for rash and wound.   Neurological:  Negative for dizziness, weakness and headaches.   All other systems reviewed and are negative.        Objective:      Physical Exam  Vitals and nursing note reviewed.   Constitutional:       General: He is not in acute distress.     Appearance: Normal appearance. He is not ill-appearing, toxic-appearing or diaphoretic.   HENT:      Head: Normocephalic.      Right Ear: Tympanic membrane, ear canal and external ear normal.      Left Ear: Tympanic membrane, ear canal and external ear normal.      Nose: Congestion present. No rhinorrhea.      Mouth/Throat:      Mouth:  Mucous membranes are moist.      Pharynx: No oropharyngeal exudate or posterior oropharyngeal erythema.   Eyes:      General: No scleral icterus.        Right eye: No discharge.         Left eye: No discharge.      Extraocular Movements: Extraocular movements intact.      Conjunctiva/sclera: Conjunctivae normal.      Pupils: Pupils are equal, round, and reactive to light.   Cardiovascular:      Rate and Rhythm: Normal rate and regular rhythm.      Pulses: Normal pulses.      Heart sounds: Normal heart sounds. No murmur heard.  Pulmonary:      Effort: Pulmonary effort is normal. No respiratory distress.      Breath sounds: Normal breath sounds. No wheezing, rhonchi or rales.   Musculoskeletal:         General: Normal range of motion.      Cervical back: Neck supple. No tenderness.   Lymphadenopathy:      Cervical: No cervical adenopathy.   Skin:     General: Skin is warm and dry.      Capillary Refill: Capillary refill takes less than 2 seconds.      Findings: No rash.   Neurological:      Mental Status: He is alert and oriented to person, place, and time.   Psychiatric:         Mood and Affect: Mood normal.         Behavior: Behavior normal.         Thought Content: Thought content normal.         Judgment: Judgment normal.         Office Visit on 03/15/2024   Component Date Value Ref Range Status    SARS Coronavirus 2 Antigen 03/15/2024 Positive (A)  Negative Final     Acceptable 03/15/2024 Yes   Final    Rapid Influenza A Ag 03/15/2024 Negative  Negative Final    Rapid Influenza B Ag 03/15/2024 Negative  Negative Final     Acceptable 03/15/2024 Yes   Final    Rapid Strep A Screen 03/15/2024 Negative  Negative Final     Acceptable 03/15/2024 Yes   Final      Assessment:       1. COVID-19    2. Cough, unspecified type    3. Chills    4. Body aches    5. Sinus problem    6. Acute pharyngitis, unspecified etiology        Plan:   COVID-19    Cough, unspecified type  -      SARS Coronavirus 2 Antigen, POCT  -     POCT Influenza A/B Rapid Antigen  -     guaiFENesin 100 mg/5 ml (ROBITUSSIN) 100 mg/5 mL syrup; Take 10 mLs (200 mg total) by mouth 3 (three) times daily as needed for Cough or Congestion.  Dispense: 473 mL; Refill: 0  -     albuterol (VENTOLIN HFA) 90 mcg/actuation inhaler; Inhale 2 puffs into the lungs every 6 (six) hours as needed for Wheezing or Shortness of Breath. Rescue  Dispense: 8.5 g; Refill: 0    Chills  -     SARS Coronavirus 2 Antigen, POCT  -     POCT Influenza A/B Rapid Antigen    Body aches  -     SARS Coronavirus 2 Antigen, POCT  -     POCT Influenza A/B Rapid Antigen    Sinus problem  -     SARS Coronavirus 2 Antigen, POCT  -     POCT Influenza A/B Rapid Antigen  -     POCT rapid strep A    Acute pharyngitis, unspecified etiology  -     azithromycin (ZITHROMAX Z-DIVYA) 250 MG tablet; Take 1 tablet (250 mg total) by mouth once daily. Take 2 tablets on day 1 and then take 1 tablet days 2-5 for 5 days  Dispense: 6 tablet; Refill: 0           Risks, benefits, and side effects were discussed with the patient. All questions were answered to the fullest satisfaction of the patient, and pt verbalized understanding and agreement to treatment plan. Pt was to call with any new or worsening symptoms, or present to the ER

## 2024-03-15 NOTE — LETTER
March 15, 2024      [facility]  [address] [facility] [place] [address]  Phone: [phone]  Fax: [phone]       Patient: Mp Cook   YOB: 1975  Date of Visit: 03/15/2024    To Whom It May Concern:    YECENIA Cook  was at [facility] on 03/15/2024. The patient may return to work/school on 03/19/2024 with no restrictions. If you have any questions or concerns, or if I can be of further assistance, please do not hesitate to contact me.    Sincerely,    ELPIDIO Conde

## 2024-03-19 ENCOUNTER — OFFICE VISIT (OUTPATIENT)
Dept: FAMILY MEDICINE | Facility: CLINIC | Age: 49
End: 2024-03-19
Payer: COMMERCIAL

## 2024-03-19 VITALS
DIASTOLIC BLOOD PRESSURE: 84 MMHG | HEART RATE: 79 BPM | OXYGEN SATURATION: 99 % | WEIGHT: 230 LBS | TEMPERATURE: 98 F | RESPIRATION RATE: 19 BRPM | SYSTOLIC BLOOD PRESSURE: 146 MMHG | BODY MASS INDEX: 34.07 KG/M2 | HEIGHT: 69 IN

## 2024-03-19 DIAGNOSIS — U07.1 LAB TEST POSITIVE FOR DETECTION OF COVID-19 VIRUS: ICD-10-CM

## 2024-03-19 DIAGNOSIS — Z20.828 CONTACT WITH OR EXPOSURE TO VIRAL DISEASE: Primary | ICD-10-CM

## 2024-03-19 LAB
CTP QC/QA: YES
SARS-COV-2 AG RESP QL IA.RAPID: POSITIVE

## 2024-03-19 PROCEDURE — 99213 OFFICE O/P EST LOW 20 MIN: CPT | Mod: ,,, | Performed by: FAMILY MEDICINE

## 2024-03-19 PROCEDURE — 3079F DIAST BP 80-89 MM HG: CPT | Mod: ,,, | Performed by: FAMILY MEDICINE

## 2024-03-19 PROCEDURE — 1159F MED LIST DOCD IN RCRD: CPT | Mod: ,,, | Performed by: FAMILY MEDICINE

## 2024-03-19 PROCEDURE — 3008F BODY MASS INDEX DOCD: CPT | Mod: ,,, | Performed by: FAMILY MEDICINE

## 2024-03-19 PROCEDURE — 87426 SARSCOV CORONAVIRUS AG IA: CPT | Mod: QW,,, | Performed by: FAMILY MEDICINE

## 2024-03-19 PROCEDURE — 3077F SYST BP >= 140 MM HG: CPT | Mod: ,,, | Performed by: FAMILY MEDICINE

## 2024-03-19 NOTE — LETTER
March 19, 2024      [facility]  [address] [facility] [place] [address]  Phone: [phone]  Fax: [phone]       Patient: Mp Cook   YOB: 1975  Date of Visit: 03/19/2024    To Whom It May Concern:    YECENIA Cook  was at [facility] on 03/19/2024. The patient may return to work/school on 03/22/2024 with no restrictions. If you have any questions or concerns, or if I can be of further assistance, please do not hesitate to contact me.    Sincerely,    Barbara Littlejohn MA

## 2024-03-19 NOTE — PROGRESS NOTES
Subjective     Patient ID: Mp Cook III is a 48 y.o. male.    Chief Complaint: Follow-up (Re test for covid )    Patient tested positive for COVID 5 days ago.  Now asymptomatic    Follow-up      Review of Systems       Objective     Physical Exam  Constitutional:       General: He is not in acute distress.     Appearance: He is not ill-appearing.   Cardiovascular:      Rate and Rhythm: Normal rate and regular rhythm.   Pulmonary:      Effort: Pulmonary effort is normal.      Breath sounds: Normal breath sounds.   Neurological:      Mental Status: He is alert.            Assessment and Plan     1. Contact with or exposure to viral disease  -     SARS Coronavirus 2 Antigen, POCT    2. Lab test positive for detection of COVID-19 virus        COVID test is still positive.  He will come in in 3 days for retest         No follow-ups on file.

## 2024-04-27 DIAGNOSIS — R06.02 SOB (SHORTNESS OF BREATH): Chronic | ICD-10-CM

## 2024-04-29 RX ORDER — FLUTICASONE FUROATE AND VILANTEROL TRIFENATATE 100; 25 UG/1; UG/1
1 POWDER RESPIRATORY (INHALATION)
Qty: 180 EACH | Refills: 0 | Status: SHIPPED | OUTPATIENT
Start: 2024-04-29

## 2024-05-30 NOTE — ADDENDUM NOTE
Addended by: KALIE MCKEON on: 5/30/2024 09:46 AM     Modules accepted: Orders     Stuart Reyes(Attending)

## 2024-08-01 ENCOUNTER — OFFICE VISIT (OUTPATIENT)
Dept: FAMILY MEDICINE | Facility: CLINIC | Age: 49
End: 2024-08-01
Payer: COMMERCIAL

## 2024-08-01 VITALS
SYSTOLIC BLOOD PRESSURE: 134 MMHG | OXYGEN SATURATION: 97 % | WEIGHT: 217.63 LBS | HEIGHT: 69 IN | DIASTOLIC BLOOD PRESSURE: 84 MMHG | HEART RATE: 63 BPM | BODY MASS INDEX: 32.24 KG/M2

## 2024-08-01 DIAGNOSIS — U09.9 COVID-19 LONG HAULER MANIFESTING CHRONIC DYSPNEA: Chronic | ICD-10-CM

## 2024-08-01 DIAGNOSIS — R05.9 COUGH, UNSPECIFIED TYPE: ICD-10-CM

## 2024-08-01 DIAGNOSIS — R06.02 SOB (SHORTNESS OF BREATH): Chronic | ICD-10-CM

## 2024-08-01 DIAGNOSIS — I10 PRIMARY HYPERTENSION: Chronic | ICD-10-CM

## 2024-08-01 DIAGNOSIS — R06.2 WHEEZING: Primary | ICD-10-CM

## 2024-08-01 DIAGNOSIS — R06.09 COVID-19 LONG HAULER MANIFESTING CHRONIC DYSPNEA: Chronic | ICD-10-CM

## 2024-08-01 RX ORDER — DEXAMETHASONE 0.75 MG/1
TABLET ORAL
Qty: 21 TABLET | Refills: 0 | Status: SHIPPED | OUTPATIENT
Start: 2024-08-01

## 2024-08-01 RX ORDER — NEBIVOLOL 10 MG/1
10 TABLET ORAL DAILY
Qty: 90 TABLET | Refills: 2 | Status: SHIPPED | OUTPATIENT
Start: 2024-08-01 | End: 2025-08-01

## 2024-08-01 RX ORDER — FLUTICASONE FUROATE AND VILANTEROL 100; 25 UG/1; UG/1
1 POWDER RESPIRATORY (INHALATION) DAILY
Qty: 180 EACH | Refills: 5 | Status: SHIPPED | OUTPATIENT
Start: 2024-08-01

## 2024-08-01 RX ORDER — ALBUTEROL SULFATE 90 UG/1
2 AEROSOL, METERED RESPIRATORY (INHALATION) EVERY 6 HOURS PRN
Qty: 8.5 G | Refills: 5 | Status: SHIPPED | OUTPATIENT
Start: 2024-08-01 | End: 2025-08-01

## 2024-08-01 NOTE — PROGRESS NOTES
Lorenzo Montes MD   [facility]  [facility] [address] [address]  [phone]      PATIENT NAME: Mp Cook III  : 1975  DATE: 24  MRN: [id number]      Billing Provider: Lorenzo Montes MD  Level of Service: NC OFFICE/OUTPT VISIT, EST, LEVL IV, 30-39 MIN  Patient PCP Information       Provider PCP Type    Lorenzo Montes MD General            Reason for Visit / Chief Complaint: Medication Refill       Update PCP  Update Chief Complaint         History of Present Illness / Problem Focused Workflow     Mp Cook III presents to the clinic with Medication Refill       Mr Cook is a COVID long hauler with SOB and wheezing.  Has been seen by pulmonology in the past for this.  He had COVID again a few months ago.  Needs refills on inhalers and BP med.        Review of Systems     Review of Systems   Constitutional:  Negative for chills, fatigue and fever.   HENT:  Negative for sinus pressure/congestion, sore throat and trouble swallowing.    Respiratory:  Positive for shortness of breath and wheezing. Negative for cough.    Cardiovascular:  Negative for chest pain, palpitations and leg swelling.   Gastrointestinal:  Negative for abdominal pain, change in bowel habit, nausea and vomiting.   Integumentary:  Negative for rash.   Neurological:  Negative for dizziness, syncope, weakness, light-headedness, numbness and headaches.   Psychiatric/Behavioral:  Negative for confusion.         Medical / Social / Family History     Past Medical History:   Diagnosis Date    Hypertension        Past Surgical History:   Procedure Laterality Date    APPENDECTOMY      TONSILLECTOMY         Social History    reports that he has never smoked. He has never been exposed to tobacco smoke. His smokeless tobacco use includes snuff. He reports current alcohol use of about 6.0 standard drinks of alcohol per week. He reports that he does not  use drugs.   Social History     Tobacco Use    Smoking status: Never     Passive exposure: Never    Smokeless tobacco: Current     Types: Snuff    Tobacco comments:     Started Snuff in 2000.   Substance Use Topics    Alcohol use: Yes     Alcohol/week: 6.0 standard drinks of alcohol     Types: 6 Cans of beer per week     Comment: drinks approximately a 6 pack every other weekend    Drug use: Never       Family History  Family History   Problem Relation Name Age of Onset    Hypertension Mother         Medications and Allergies     Medications  Outpatient Medications Marked as Taking for the 8/1/24 encounter (Office Visit) with Lorenzo Montes MD   Medication Sig Dispense Refill    [DISCONTINUED] albuterol (VENTOLIN HFA) 90 mcg/actuation inhaler Inhale 2 puffs into the lungs every 6 (six) hours as needed for Wheezing or Shortness of Breath. Rescue 8.5 g 0    [DISCONTINUED] BREO ELLIPTA 100-25 mcg/dose diskus inhaler INHALE 1 PUFF INTO THE LUNGS EVERY  each 0    [DISCONTINUED] nebivoloL (BYSTOLIC) 10 MG Tab Take 1 tablet (10 mg total) by mouth once daily. 90 tablet 2       Allergies  Review of patient's allergies indicates:   Allergen Reactions    Opioids - morphine analogues        Physical Examination     Vitals:    08/01/24 1102   BP: 134/84   Pulse: 63     Physical Exam  Vitals reviewed.   Constitutional:       Appearance: Normal appearance.   HENT:      Head: Normocephalic and atraumatic.   Eyes:      Extraocular Movements: Extraocular movements intact.      Conjunctiva/sclera: Conjunctivae normal.      Pupils: Pupils are equal, round, and reactive to light.   Cardiovascular:      Rate and Rhythm: Normal rate and regular rhythm.   Pulmonary:      Effort: Pulmonary effort is normal.      Breath sounds: Wheezing (R>L) present.   Musculoskeletal:         General: Normal range of motion.   Skin:     General: Skin is warm and dry.   Neurological:      General: No focal deficit present.      Mental Status: He  is alert and oriented to person, place, and time.   Psychiatric:         Mood and Affect: Mood normal.         Behavior: Behavior normal.        Complete PFT with bronchodilator  Order: [id number]  Status: Final result       Visible to patient: No (inaccessible in MyChart)       Next appt: None       Dx: SOB (shortness of breath)    0 Result Notes        Procedure Note    Pulmonary function test  Forced vital capacity 4.56 L 94% predicted  FEV1 4 L or 103% predicted  FEV1 ratio 80%   Normal small airways mild broncho reactivity  Normal lung volumes  Normal DLCO  Normal pulmonary function test       Last Resulted: 05/25/23 13:30 CDT               Assessment and Plan (including Health Maintenance)      Problem List  Smart Sets  Document Outside HM   :    Plan: meds refilled as requested.  No med changes made.    This encounter included increased complexity inherent to the evaluation and management associated with medical care services that serve as the continuing focal point for all needed health care services and/or with medical care services that are part of ongoing care related to a patients single, serious condition or a complex condition.  Previous labs and encounters have been reviewed during the course of this visit in order to make medical decisions related to ongoing care of the patient.       Health Maintenance Due   Topic Date Due    PROSTATE-SPECIFIC ANTIGEN  Never done    Hepatitis C Screening  Never done    Lipid Panel  Never done    HIV Screening  Never done    TETANUS VACCINE  Never done    Hemoglobin A1c (Diabetic Prevention Screening)  Never done    Colorectal Cancer Screening  Never done    COVID-19 Vaccine (3 - 2023-24 season) 09/01/2023       Problem List Items Addressed This Visit          Pulmonary    SOB (shortness of breath)    Relevant Medications    fluticasone furoate-vilanteroL (BREO ELLIPTA) 100-25 mcg/dose diskus inhaler    dexAMETHasone (DECADRON) 0.75 MG Tab    Cough    Relevant  Medications    albuterol (VENTOLIN HFA) 90 mcg/actuation inhaler       Cardiac/Vascular    Primary hypertension (Chronic)    Relevant Medications    nebivoloL (BYSTOLIC) 10 MG Tab     Other Visit Diagnoses       Wheezing    -  Primary    Relevant Medications    dexAMETHasone (DECADRON) 0.75 MG Tab    COVID-19 long hasandy manifesting chronic dyspnea  (Chronic)               Health Maintenance Topics with due status: Not Due       Topic Last Completion Date    Influenza Vaccine Not Due       No future appointments.         Signature:  MD BARBARA [facility]  [facility]  [address] [place] [id number]  [phone]    Date of encounter: 8/1/24

## 2024-10-31 DIAGNOSIS — I10 PRIMARY HYPERTENSION: Chronic | ICD-10-CM

## 2024-10-31 RX ORDER — NEBIVOLOL 10 MG/1
10 TABLET ORAL DAILY
Qty: 90 TABLET | Refills: 2 | Status: SHIPPED | OUTPATIENT
Start: 2024-10-31 | End: 2024-11-01 | Stop reason: SDUPTHER

## 2024-11-01 DIAGNOSIS — I10 PRIMARY HYPERTENSION: Chronic | ICD-10-CM

## 2024-11-01 RX ORDER — NEBIVOLOL 10 MG/1
10 TABLET ORAL DAILY
Qty: 90 TABLET | Refills: 2 | Status: SHIPPED | OUTPATIENT
Start: 2024-11-01 | End: 2025-11-01

## 2025-02-12 DIAGNOSIS — I10 PRIMARY HYPERTENSION: Chronic | ICD-10-CM

## 2025-02-12 DIAGNOSIS — R05.9 COUGH, UNSPECIFIED TYPE: ICD-10-CM

## 2025-02-12 DIAGNOSIS — R06.02 SOB (SHORTNESS OF BREATH): Chronic | ICD-10-CM

## 2025-02-12 RX ORDER — FLUTICASONE FUROATE AND VILANTEROL 100; 25 UG/1; UG/1
1 POWDER RESPIRATORY (INHALATION) DAILY
Qty: 180 EACH | Refills: 1 | Status: SHIPPED | OUTPATIENT
Start: 2025-02-12

## 2025-02-12 RX ORDER — NEBIVOLOL 10 MG/1
10 TABLET ORAL DAILY
Qty: 30 TABLET | Refills: 1 | Status: SHIPPED | OUTPATIENT
Start: 2025-02-12

## 2025-02-12 RX ORDER — ALBUTEROL SULFATE 90 UG/1
2 INHALANT RESPIRATORY (INHALATION) EVERY 6 HOURS PRN
Qty: 8.5 G | Refills: 1 | Status: SHIPPED | OUTPATIENT
Start: 2025-02-12 | End: 2026-02-12

## 2025-02-20 DIAGNOSIS — I10 PRIMARY HYPERTENSION: Chronic | ICD-10-CM

## 2025-02-20 DIAGNOSIS — R06.02 SOB (SHORTNESS OF BREATH): Chronic | ICD-10-CM

## 2025-02-20 RX ORDER — NEBIVOLOL 10 MG/1
10 TABLET ORAL DAILY
Qty: 30 TABLET | Refills: 1 | Status: SHIPPED | OUTPATIENT
Start: 2025-02-20

## 2025-02-20 RX ORDER — FLUTICASONE FUROATE AND VILANTEROL 100; 25 UG/1; UG/1
1 POWDER RESPIRATORY (INHALATION) DAILY
Qty: 180 EACH | Refills: 1 | Status: SHIPPED | OUTPATIENT
Start: 2025-02-20

## 2025-02-20 NOTE — TELEPHONE ENCOUNTER
Pt states wasn't able to get med at [facility] here before had to go to work.  Wants it sent to [place].

## 2025-04-01 ENCOUNTER — CLINICAL SUPPORT (OUTPATIENT)
Dept: PRIMARY CARE CLINIC | Facility: CLINIC | Age: 50
End: 2025-04-01

## 2025-04-01 DIAGNOSIS — Z01.10 ENCOUNTER FOR HEARING EXAMINATION, UNSPECIFIED WHETHER ABNORMAL FINDINGS: Primary | ICD-10-CM

## 2025-04-01 PROCEDURE — 92552 PURE TONE AUDIOMETRY AIR: CPT | Mod: ,,, | Performed by: NURSE PRACTITIONER

## 2025-04-01 NOTE — PROGRESS NOTES
Patient ID: Mp Cook III is a 49 y.o. male.    Chief Complaint: No chief complaint on file.    History of Present Illness              Physical Exam              Assessment & Plan               1. Encounter for hearing examination, unspecified whether abnormal findings        No follow-ups on file.    This note was generated with the assistance of ambient listening technology. Verbal consent was obtained by the patient and accompanying visitor(s) for the recording of patient appointment to facilitate this note. I attest to having reviewed and edited the generated note for accuracy, though some syntax or spelling errors may persist. Please contact the author of this note for any clarification.

## 2025-04-16 DIAGNOSIS — I10 PRIMARY HYPERTENSION: Chronic | ICD-10-CM

## 2025-04-16 RX ORDER — NEBIVOLOL 10 MG/1
10 TABLET ORAL DAILY
Qty: 90 TABLET | Refills: 1 | Status: SHIPPED | OUTPATIENT
Start: 2025-04-16